# Patient Record
Sex: MALE | Race: WHITE | HISPANIC OR LATINO | URBAN - METROPOLITAN AREA
[De-identification: names, ages, dates, MRNs, and addresses within clinical notes are randomized per-mention and may not be internally consistent; named-entity substitution may affect disease eponyms.]

---

## 2022-02-13 ENCOUNTER — APPOINTMENT (OUTPATIENT)
Dept: RADIOLOGY | Facility: MEDICAL CENTER | Age: 47
DRG: 853 | End: 2022-02-13
Attending: EMERGENCY MEDICINE
Payer: COMMERCIAL

## 2022-02-13 ENCOUNTER — HOSPITAL ENCOUNTER (INPATIENT)
Facility: MEDICAL CENTER | Age: 47
LOS: 4 days | DRG: 853 | End: 2022-02-17
Attending: EMERGENCY MEDICINE | Admitting: INTERNAL MEDICINE
Payer: COMMERCIAL

## 2022-02-13 DIAGNOSIS — I21.4 NSTEMI (NON-ST ELEVATED MYOCARDIAL INFARCTION) (HCC): ICD-10-CM

## 2022-02-13 DIAGNOSIS — R41.82 ALTERED MENTAL STATUS, UNSPECIFIED ALTERED MENTAL STATUS TYPE: ICD-10-CM

## 2022-02-13 DIAGNOSIS — I46.9 CARDIAC ARREST (HCC): ICD-10-CM

## 2022-02-13 DIAGNOSIS — R57.0 CARDIOGENIC SHOCK (HCC): ICD-10-CM

## 2022-02-13 DIAGNOSIS — J96.01 ACUTE RESPIRATORY FAILURE WITH HYPOXIA (HCC): ICD-10-CM

## 2022-02-13 LAB
ALBUMIN SERPL BCP-MCNC: 4.8 G/DL (ref 3.2–4.9)
ALBUMIN/GLOB SERPL: 1.5 G/DL
ALP SERPL-CCNC: 89 U/L (ref 30–99)
ALT SERPL-CCNC: 133 U/L (ref 2–50)
ANION GAP SERPL CALC-SCNC: 21 MMOL/L (ref 7–16)
APAP SERPL-MCNC: <5 UG/ML (ref 10–30)
AST SERPL-CCNC: 207 U/L (ref 12–45)
BASOPHILS # BLD AUTO: 0 % (ref 0–1.8)
BASOPHILS # BLD: 0 K/UL (ref 0–0.12)
BILIRUB SERPL-MCNC: 0.5 MG/DL (ref 0.1–1.5)
BUN SERPL-MCNC: 15 MG/DL (ref 8–22)
BURR CELLS BLD QL SMEAR: NORMAL
CALCIUM SERPL-MCNC: 9.2 MG/DL (ref 8.5–10.5)
CHLORIDE SERPL-SCNC: 100 MMOL/L (ref 96–112)
CO2 SERPL-SCNC: 17 MMOL/L (ref 20–33)
CREAT SERPL-MCNC: 0.95 MG/DL (ref 0.5–1.4)
EKG IMPRESSION: NORMAL
EKG IMPRESSION: NORMAL
EOSINOPHIL # BLD AUTO: 0.15 K/UL (ref 0–0.51)
EOSINOPHIL NFR BLD: 0.9 % (ref 0–6.9)
ERYTHROCYTE [DISTWIDTH] IN BLOOD BY AUTOMATED COUNT: 40.4 FL (ref 35.9–50)
ETHANOL BLD-MCNC: <10.1 MG/DL (ref 0–10)
GLOBULIN SER CALC-MCNC: 3.1 G/DL (ref 1.9–3.5)
GLUCOSE BLD-MCNC: 232 MG/DL (ref 65–99)
GLUCOSE SERPL-MCNC: 255 MG/DL (ref 65–99)
HCT VFR BLD AUTO: 47.9 % (ref 42–52)
HGB BLD-MCNC: 16.6 G/DL (ref 14–18)
LACTATE BLD-SCNC: 5.2 MMOL/L (ref 0.5–2)
LYMPHOCYTES # BLD AUTO: 3.44 K/UL (ref 1–4.8)
LYMPHOCYTES NFR BLD: 20 % (ref 22–41)
MANUAL DIFF BLD: NORMAL
MCH RBC QN AUTO: 32.2 PG (ref 27–33)
MCHC RBC AUTO-ENTMCNC: 34.7 G/DL (ref 33.7–35.3)
MCV RBC AUTO: 92.8 FL (ref 81.4–97.8)
MONOCYTES # BLD AUTO: 1.79 K/UL (ref 0–0.85)
MONOCYTES NFR BLD AUTO: 10.4 % (ref 0–13.4)
MORPHOLOGY BLD-IMP: NORMAL
MYELOCYTES NFR BLD MANUAL: 1.7 %
NEUTROPHILS # BLD AUTO: 11.52 K/UL (ref 1.82–7.42)
NEUTROPHILS NFR BLD: 67 % (ref 44–72)
NRBC # BLD AUTO: 0 K/UL
NRBC BLD-RTO: 0 /100 WBC
PLATELET # BLD AUTO: 249 K/UL (ref 164–446)
PLATELET BLD QL SMEAR: NORMAL
PMV BLD AUTO: 11.1 FL (ref 9–12.9)
POIKILOCYTOSIS BLD QL SMEAR: NORMAL
POTASSIUM SERPL-SCNC: 3.5 MMOL/L (ref 3.6–5.5)
PROT SERPL-MCNC: 7.9 G/DL (ref 6–8.2)
RBC # BLD AUTO: 5.16 M/UL (ref 4.7–6.1)
RBC BLD AUTO: PRESENT
SALICYLATES SERPL-MCNC: <1 MG/DL (ref 15–25)
SODIUM SERPL-SCNC: 138 MMOL/L (ref 135–145)
TROPONIN T SERPL-MCNC: 82 NG/L (ref 6–19)
WBC # BLD AUTO: 17.2 K/UL (ref 4.8–10.8)

## 2022-02-13 PROCEDURE — 700111 HCHG RX REV CODE 636 W/ 250 OVERRIDE (IP)

## 2022-02-13 PROCEDURE — 82077 ASSAY SPEC XCP UR&BREATH IA: CPT

## 2022-02-13 PROCEDURE — 71045 X-RAY EXAM CHEST 1 VIEW: CPT

## 2022-02-13 PROCEDURE — 700111 HCHG RX REV CODE 636 W/ 250 OVERRIDE (IP): Performed by: INTERNAL MEDICINE

## 2022-02-13 PROCEDURE — 82962 GLUCOSE BLOOD TEST: CPT

## 2022-02-13 PROCEDURE — 302214 INTUBATION BOX: Performed by: EMERGENCY MEDICINE

## 2022-02-13 PROCEDURE — 93005 ELECTROCARDIOGRAM TRACING: CPT | Performed by: EMERGENCY MEDICINE

## 2022-02-13 PROCEDURE — 31500 INSERT EMERGENCY AIRWAY: CPT

## 2022-02-13 PROCEDURE — 700111 HCHG RX REV CODE 636 W/ 250 OVERRIDE (IP): Performed by: EMERGENCY MEDICINE

## 2022-02-13 PROCEDURE — 93005 ELECTROCARDIOGRAM TRACING: CPT

## 2022-02-13 PROCEDURE — 94760 N-INVAS EAR/PLS OXIMETRY 1: CPT

## 2022-02-13 PROCEDURE — 92950 HEART/LUNG RESUSCITATION CPR: CPT

## 2022-02-13 PROCEDURE — 80053 COMPREHEN METABOLIC PANEL: CPT

## 2022-02-13 PROCEDURE — 96375 TX/PRO/DX INJ NEW DRUG ADDON: CPT

## 2022-02-13 PROCEDURE — 700101 HCHG RX REV CODE 250: Performed by: INTERNAL MEDICINE

## 2022-02-13 PROCEDURE — C1751 CATH, INF, PER/CENT/MIDLINE: HCPCS

## 2022-02-13 PROCEDURE — 80179 DRUG ASSAY SALICYLATE: CPT

## 2022-02-13 PROCEDURE — 0BH17EZ INSERTION OF ENDOTRACHEAL AIRWAY INTO TRACHEA, VIA NATURAL OR ARTIFICIAL OPENING: ICD-10-PCS | Performed by: EMERGENCY MEDICINE

## 2022-02-13 PROCEDURE — 5A1945Z RESPIRATORY VENTILATION, 24-96 CONSECUTIVE HOURS: ICD-10-PCS | Performed by: EMERGENCY MEDICINE

## 2022-02-13 PROCEDURE — 99291 CRITICAL CARE FIRST HOUR: CPT

## 2022-02-13 PROCEDURE — 99223 1ST HOSP IP/OBS HIGH 75: CPT | Performed by: STUDENT IN AN ORGANIZED HEALTH CARE EDUCATION/TRAINING PROGRAM

## 2022-02-13 PROCEDURE — 80143 DRUG ASSAY ACETAMINOPHEN: CPT

## 2022-02-13 PROCEDURE — 70450 CT HEAD/BRAIN W/O DYE: CPT

## 2022-02-13 PROCEDURE — 83605 ASSAY OF LACTIC ACID: CPT

## 2022-02-13 PROCEDURE — 84484 ASSAY OF TROPONIN QUANT: CPT

## 2022-02-13 PROCEDURE — 85007 BL SMEAR W/DIFF WBC COUNT: CPT

## 2022-02-13 PROCEDURE — 700101 HCHG RX REV CODE 250

## 2022-02-13 PROCEDURE — 85027 COMPLETE CBC AUTOMATED: CPT

## 2022-02-13 PROCEDURE — 770022 HCHG ROOM/CARE - ICU (200)

## 2022-02-13 PROCEDURE — 02HV33Z INSERTION OF INFUSION DEVICE INTO SUPERIOR VENA CAVA, PERCUTANEOUS APPROACH: ICD-10-PCS | Performed by: EMERGENCY MEDICINE

## 2022-02-13 PROCEDURE — 94002 VENT MGMT INPAT INIT DAY: CPT

## 2022-02-13 PROCEDURE — 303105 HCHG CATHETER EXTRA

## 2022-02-13 PROCEDURE — 51702 INSERT TEMP BLADDER CATH: CPT

## 2022-02-13 PROCEDURE — 36556 INSERT NON-TUNNEL CV CATH: CPT

## 2022-02-13 RX ORDER — SUCCINYLCHOLINE CHLORIDE 20 MG/ML
150 INJECTION INTRAMUSCULAR; INTRAVENOUS ONCE
Status: COMPLETED | OUTPATIENT
Start: 2022-02-13 | End: 2022-02-13

## 2022-02-13 RX ORDER — HEPARIN SODIUM 5000 [USP'U]/100ML
0-30 INJECTION, SOLUTION INTRAVENOUS CONTINUOUS
Status: DISCONTINUED | OUTPATIENT
Start: 2022-02-14 | End: 2022-02-16

## 2022-02-13 RX ORDER — HEPARIN SODIUM 1000 [USP'U]/ML
2000 INJECTION, SOLUTION INTRAVENOUS; SUBCUTANEOUS PRN
Status: DISCONTINUED | OUTPATIENT
Start: 2022-02-13 | End: 2022-02-16

## 2022-02-13 RX ORDER — NALOXONE HYDROCHLORIDE 1 MG/ML
INJECTION INTRAMUSCULAR; INTRAVENOUS; SUBCUTANEOUS
Status: DISCONTINUED
Start: 2022-02-13 | End: 2022-02-13

## 2022-02-13 RX ORDER — HEPARIN SODIUM 1000 [USP'U]/ML
4000 INJECTION, SOLUTION INTRAVENOUS; SUBCUTANEOUS ONCE
Status: COMPLETED | OUTPATIENT
Start: 2022-02-14 | End: 2022-02-14

## 2022-02-13 RX ORDER — KETAMINE HYDROCHLORIDE 50 MG/ML
INJECTION, SOLUTION INTRAMUSCULAR; INTRAVENOUS
Status: DISCONTINUED
Start: 2022-02-13 | End: 2022-02-13

## 2022-02-13 RX ORDER — NOREPINEPHRINE BITARTRATE 0.03 MG/ML
INJECTION, SOLUTION INTRAVENOUS
Status: DISCONTINUED
Start: 2022-02-13 | End: 2022-02-13

## 2022-02-13 RX ORDER — KETAMINE HYDROCHLORIDE 50 MG/ML
150 INJECTION, SOLUTION INTRAMUSCULAR; INTRAVENOUS ONCE
Status: ACTIVE | OUTPATIENT
Start: 2022-02-13 | End: 2022-02-14

## 2022-02-13 RX ORDER — NALOXONE HYDROCHLORIDE 1 MG/ML
2 INJECTION INTRAMUSCULAR; INTRAVENOUS; SUBCUTANEOUS ONCE
Status: COMPLETED | OUTPATIENT
Start: 2022-02-13 | End: 2022-02-13

## 2022-02-13 RX ADMIN — Medication 100 MCG: at 22:15

## 2022-02-13 RX ADMIN — NALOXONE HYDROCHLORIDE 2 MG: 1 INJECTION INTRAMUSCULAR; INTRAVENOUS; SUBCUTANEOUS at 21:43

## 2022-02-13 RX ADMIN — Medication 100 MCG: at 22:00

## 2022-02-13 RX ADMIN — SUCCINYLCHOLINE CHLORIDE 150 MG: 20 INJECTION, SOLUTION INTRAMUSCULAR; INTRAVENOUS at 21:48

## 2022-02-13 RX ADMIN — KETAMINE HYDROCHLORIDE 150 MG: 50 INJECTION INTRAMUSCULAR; INTRAVENOUS at 21:48

## 2022-02-13 RX ADMIN — KETAMINE HYDROCHLORIDE 50 MG: 50 INJECTION INTRAMUSCULAR; INTRAVENOUS at 23:15

## 2022-02-13 RX ADMIN — NALOXONE HYDROCHLORIDE 2 MG: 1 INJECTION PARENTERAL at 21:43

## 2022-02-13 ASSESSMENT — FIBROSIS 4 INDEX: FIB4 SCORE: 3.39

## 2022-02-14 ENCOUNTER — APPOINTMENT (OUTPATIENT)
Dept: RADIOLOGY | Facility: MEDICAL CENTER | Age: 47
DRG: 853 | End: 2022-02-14
Attending: INTERNAL MEDICINE
Payer: COMMERCIAL

## 2022-02-14 ENCOUNTER — APPOINTMENT (OUTPATIENT)
Dept: CARDIOLOGY | Facility: MEDICAL CENTER | Age: 47
DRG: 853 | End: 2022-02-14
Attending: EMERGENCY MEDICINE
Payer: COMMERCIAL

## 2022-02-14 ENCOUNTER — APPOINTMENT (OUTPATIENT)
Dept: RADIOLOGY | Facility: MEDICAL CENTER | Age: 47
DRG: 853 | End: 2022-02-14
Attending: STUDENT IN AN ORGANIZED HEALTH CARE EDUCATION/TRAINING PROGRAM
Payer: COMMERCIAL

## 2022-02-14 PROBLEM — E11.9 TYPE 2 DIABETES MELLITUS (HCC): Status: ACTIVE | Noted: 2022-02-14

## 2022-02-14 PROBLEM — I21.4 NSTEMI (NON-ST ELEVATED MYOCARDIAL INFARCTION) (HCC): Status: ACTIVE | Noted: 2022-02-14

## 2022-02-14 PROBLEM — J96.00 RESPIRATORY FAILURE, ACUTE (HCC): Status: ACTIVE | Noted: 2022-02-14

## 2022-02-14 PROBLEM — G93.40 ACUTE ENCEPHALOPATHY: Status: ACTIVE | Noted: 2022-02-14

## 2022-02-14 PROBLEM — I24.9 ACS (ACUTE CORONARY SYNDROME) (HCC): Status: ACTIVE | Noted: 2022-02-14

## 2022-02-14 PROBLEM — J96.01 ACUTE RESPIRATORY FAILURE WITH HYPOXIA (HCC): Status: ACTIVE | Noted: 2022-02-14

## 2022-02-14 PROBLEM — R57.0 CARDIOGENIC SHOCK (HCC): Status: ACTIVE | Noted: 2022-02-14

## 2022-02-14 PROBLEM — E87.20 LACTIC ACIDOSIS: Status: ACTIVE | Noted: 2022-02-14

## 2022-02-14 PROBLEM — E83.42 HYPOMAGNESEMIA: Status: ACTIVE | Noted: 2022-02-14

## 2022-02-14 LAB
ALBUMIN SERPL BCP-MCNC: 4.4 G/DL (ref 3.2–4.9)
ALBUMIN/GLOB SERPL: 1.6 G/DL
ALP SERPL-CCNC: 74 U/L (ref 30–99)
ALT SERPL-CCNC: 143 U/L (ref 2–50)
AMPHET UR QL SCN: NEGATIVE
ANION GAP SERPL CALC-SCNC: 17 MMOL/L (ref 7–16)
APTT PPP: 137.2 SEC (ref 24.7–36)
AST SERPL-CCNC: 190 U/L (ref 12–45)
BARBITURATES UR QL SCN: NEGATIVE
BASE EXCESS BLDA CALC-SCNC: -4 MMOL/L (ref -4–3)
BENZODIAZ UR QL SCN: NEGATIVE
BILIRUB SERPL-MCNC: 0.5 MG/DL (ref 0.1–1.5)
BODY TEMPERATURE: ABNORMAL DEGREES
BREATHS SETTING VENT: 20
BUN SERPL-MCNC: 19 MG/DL (ref 8–22)
BZE UR QL SCN: NEGATIVE
CALCIUM SERPL-MCNC: 8.5 MG/DL (ref 8.5–10.5)
CANNABINOIDS UR QL SCN: NEGATIVE
CHLORIDE SERPL-SCNC: 105 MMOL/L (ref 96–112)
CO2 BLDA-SCNC: 22 MMOL/L (ref 20–33)
CO2 SERPL-SCNC: 17 MMOL/L (ref 20–33)
CREAT SERPL-MCNC: 0.7 MG/DL (ref 0.5–1.4)
CRP SERPL HS-MCNC: <0.3 MG/DL (ref 0–0.75)
DELSYS IDSYS: ABNORMAL
END TIDAL CARBON DIOXIDE IECO2: 30 MMHG
EST. AVERAGE GLUCOSE BLD GHB EST-MCNC: 148 MG/DL
FLUAV RNA SPEC QL NAA+PROBE: NEGATIVE
FLUBV RNA SPEC QL NAA+PROBE: NEGATIVE
GLOBULIN SER CALC-MCNC: 2.7 G/DL (ref 1.9–3.5)
GLUCOSE BLD-MCNC: 151 MG/DL (ref 65–99)
GLUCOSE BLD-MCNC: 221 MG/DL (ref 65–99)
GLUCOSE BLD-MCNC: 227 MG/DL (ref 65–99)
GLUCOSE BLD-MCNC: 258 MG/DL (ref 65–99)
GLUCOSE SERPL-MCNC: 233 MG/DL (ref 65–99)
HBA1C MFR BLD: 6.8 % (ref 4–5.6)
HCO3 BLDA-SCNC: 21.1 MMOL/L (ref 17–25)
HOROWITZ INDEX BLDA+IHG-RTO: 190 MM[HG]
INR PPP: 1.2 (ref 0.87–1.13)
LACTATE BLD-SCNC: 1.9 MMOL/L (ref 0.5–2)
LACTATE BLD-SCNC: 2.3 MMOL/L (ref 0.5–2)
LACTATE BLD-SCNC: 2.5 MMOL/L (ref 0.5–2)
LACTATE BLD-SCNC: 5.4 MMOL/L (ref 0.5–2)
LV EJECT FRACT MOD 2C 99903: 26.89
LV EJECT FRACT MOD 4C 99902: 29.84
LV EJECT FRACT MOD BP 99901: 27.97
MAGNESIUM SERPL-MCNC: 1.6 MG/DL (ref 1.5–2.5)
MAGNESIUM SERPL-MCNC: 1.8 MG/DL (ref 1.5–2.5)
METHADONE UR QL SCN: NEGATIVE
MODE IMODE: ABNORMAL
O2/TOTAL GAS SETTING VFR VENT: 100 %
OPIATES UR QL SCN: NEGATIVE
OXYCODONE UR QL SCN: NEGATIVE
PCO2 BLDA: 37.9 MMHG (ref 26–37)
PCO2 TEMP ADJ BLDA: 37.9 MMHG (ref 26–37)
PCP UR QL SCN: NEGATIVE
PEEP END EXPIRATORY PRESSURE IPEEP: 8 CMH20
PH BLDA: 7.35 [PH] (ref 7.4–7.5)
PH TEMP ADJ BLDA: 7.35 [PH] (ref 7.4–7.5)
PHOSPHATE SERPL-MCNC: 2.9 MG/DL (ref 2.5–4.5)
PHOSPHATE SERPL-MCNC: 3.3 MG/DL (ref 2.5–4.5)
PO2 BLDA: 190 MMHG (ref 64–87)
PO2 TEMP ADJ BLDA: 190 MMHG (ref 64–87)
POTASSIUM SERPL-SCNC: 3.7 MMOL/L (ref 3.6–5.5)
PREALB SERPL-MCNC: 8.4 MG/DL (ref 18–38)
PROPOXYPH UR QL SCN: NEGATIVE
PROT SERPL-MCNC: 7.1 G/DL (ref 6–8.2)
PROTHROMBIN TIME: 14.8 SEC (ref 12–14.6)
RSV RNA SPEC QL NAA+PROBE: NEGATIVE
SAO2 % BLDA: 100 % (ref 93–99)
SARS-COV-2 RNA RESP QL NAA+PROBE: NOTDETECTED
SODIUM SERPL-SCNC: 139 MMOL/L (ref 135–145)
SPECIMEN DRAWN FROM PATIENT: ABNORMAL
SPECIMEN SOURCE: NORMAL
TIDAL VOLUME IVT: 420 ML
TROPONIN T SERPL-MCNC: 1987 NG/L (ref 6–19)
TROPONIN T SERPL-MCNC: 415 NG/L (ref 6–19)
UFH PPP CHRO-ACNC: 0.33 IU/ML
UFH PPP CHRO-ACNC: 0.42 IU/ML
UFH PPP CHRO-ACNC: 0.57 IU/ML

## 2022-02-14 PROCEDURE — 96375 TX/PRO/DX INJ NEW DRUG ADDON: CPT

## 2022-02-14 PROCEDURE — 84134 ASSAY OF PREALBUMIN: CPT

## 2022-02-14 PROCEDURE — 36600 WITHDRAWAL OF ARTERIAL BLOOD: CPT

## 2022-02-14 PROCEDURE — A9270 NON-COVERED ITEM OR SERVICE: HCPCS | Performed by: INTERNAL MEDICINE

## 2022-02-14 PROCEDURE — A9270 NON-COVERED ITEM OR SERVICE: HCPCS | Performed by: STUDENT IN AN ORGANIZED HEALTH CARE EDUCATION/TRAINING PROGRAM

## 2022-02-14 PROCEDURE — 80053 COMPREHEN METABOLIC PANEL: CPT

## 2022-02-14 PROCEDURE — 95822 EEG COMA OR SLEEP ONLY: CPT | Performed by: PSYCHIATRY & NEUROLOGY

## 2022-02-14 PROCEDURE — B246ZZ4 ULTRASONOGRAPHY OF RIGHT AND LEFT HEART, TRANSESOPHAGEAL: ICD-10-PCS | Performed by: ANESTHESIOLOGY

## 2022-02-14 PROCEDURE — 700101 HCHG RX REV CODE 250: Performed by: INTERNAL MEDICINE

## 2022-02-14 PROCEDURE — 83605 ASSAY OF LACTIC ACID: CPT

## 2022-02-14 PROCEDURE — 85730 THROMBOPLASTIN TIME PARTIAL: CPT

## 2022-02-14 PROCEDURE — 95822 EEG COMA OR SLEEP ONLY: CPT | Mod: 26 | Performed by: PSYCHIATRY & NEUROLOGY

## 2022-02-14 PROCEDURE — 700111 HCHG RX REV CODE 636 W/ 250 OVERRIDE (IP)

## 2022-02-14 PROCEDURE — 0241U HCHG SARS-COV-2 COVID-19 NFCT DS RESP RNA 4 TRGT MIC: CPT

## 2022-02-14 PROCEDURE — 700111 HCHG RX REV CODE 636 W/ 250 OVERRIDE (IP): Performed by: STUDENT IN AN ORGANIZED HEALTH CARE EDUCATION/TRAINING PROGRAM

## 2022-02-14 PROCEDURE — 700111 HCHG RX REV CODE 636 W/ 250 OVERRIDE (IP): Performed by: INTERNAL MEDICINE

## 2022-02-14 PROCEDURE — 85520 HEPARIN ASSAY: CPT | Mod: 91

## 2022-02-14 PROCEDURE — 80307 DRUG TEST PRSMV CHEM ANLYZR: CPT

## 2022-02-14 PROCEDURE — C9803 HOPD COVID-19 SPEC COLLECT: HCPCS | Performed by: STUDENT IN AN ORGANIZED HEALTH CARE EDUCATION/TRAINING PROGRAM

## 2022-02-14 PROCEDURE — 99292 CRITICAL CARE ADDL 30 MIN: CPT | Performed by: INTERNAL MEDICINE

## 2022-02-14 PROCEDURE — 82803 BLOOD GASES ANY COMBINATION: CPT

## 2022-02-14 PROCEDURE — 770022 HCHG ROOM/CARE - ICU (200)

## 2022-02-14 PROCEDURE — 700102 HCHG RX REV CODE 250 W/ 637 OVERRIDE(OP): Performed by: STUDENT IN AN ORGANIZED HEALTH CARE EDUCATION/TRAINING PROGRAM

## 2022-02-14 PROCEDURE — 83735 ASSAY OF MAGNESIUM: CPT

## 2022-02-14 PROCEDURE — 700111 HCHG RX REV CODE 636 W/ 250 OVERRIDE (IP): Performed by: EMERGENCY MEDICINE

## 2022-02-14 PROCEDURE — 93306 TTE W/DOPPLER COMPLETE: CPT | Mod: 26 | Performed by: STUDENT IN AN ORGANIZED HEALTH CARE EDUCATION/TRAINING PROGRAM

## 2022-02-14 PROCEDURE — 700101 HCHG RX REV CODE 250

## 2022-02-14 PROCEDURE — 83036 HEMOGLOBIN GLYCOSYLATED A1C: CPT

## 2022-02-14 PROCEDURE — 99291 CRITICAL CARE FIRST HOUR: CPT | Mod: GC | Performed by: INTERNAL MEDICINE

## 2022-02-14 PROCEDURE — 85610 PROTHROMBIN TIME: CPT

## 2022-02-14 PROCEDURE — 93306 TTE W/DOPPLER COMPLETE: CPT

## 2022-02-14 PROCEDURE — 74018 RADEX ABDOMEN 1 VIEW: CPT

## 2022-02-14 PROCEDURE — 96368 THER/DIAG CONCURRENT INF: CPT

## 2022-02-14 PROCEDURE — 82962 GLUCOSE BLOOD TEST: CPT | Mod: 91

## 2022-02-14 PROCEDURE — 84484 ASSAY OF TROPONIN QUANT: CPT

## 2022-02-14 PROCEDURE — 86140 C-REACTIVE PROTEIN: CPT

## 2022-02-14 PROCEDURE — 700102 HCHG RX REV CODE 250 W/ 637 OVERRIDE(OP): Performed by: INTERNAL MEDICINE

## 2022-02-14 PROCEDURE — 94003 VENT MGMT INPAT SUBQ DAY: CPT

## 2022-02-14 PROCEDURE — 700105 HCHG RX REV CODE 258

## 2022-02-14 PROCEDURE — 94799 UNLISTED PULMONARY SVC/PX: CPT

## 2022-02-14 PROCEDURE — 96365 THER/PROPH/DIAG IV INF INIT: CPT

## 2022-02-14 PROCEDURE — 84100 ASSAY OF PHOSPHORUS: CPT | Mod: 91

## 2022-02-14 PROCEDURE — 99233 SBSQ HOSP IP/OBS HIGH 50: CPT | Performed by: INTERNAL MEDICINE

## 2022-02-14 RX ORDER — FAMOTIDINE 20 MG/1
20 TABLET, FILM COATED ORAL EVERY 12 HOURS
Status: DISCONTINUED | OUTPATIENT
Start: 2022-02-14 | End: 2022-02-17 | Stop reason: HOSPADM

## 2022-02-14 RX ORDER — ASPIRIN 81 MG/1
81 TABLET, CHEWABLE ORAL DAILY
Status: DISCONTINUED | OUTPATIENT
Start: 2022-02-15 | End: 2022-02-16

## 2022-02-14 RX ORDER — ATORVASTATIN CALCIUM 80 MG/1
80 TABLET, FILM COATED ORAL NIGHTLY
COMMUNITY

## 2022-02-14 RX ORDER — NOREPINEPHRINE BITARTRATE 0.03 MG/ML
0-30 INJECTION, SOLUTION INTRAVENOUS CONTINUOUS
Status: DISCONTINUED | OUTPATIENT
Start: 2022-02-14 | End: 2022-02-15

## 2022-02-14 RX ORDER — MAGNESIUM SULFATE HEPTAHYDRATE 40 MG/ML
4 INJECTION, SOLUTION INTRAVENOUS ONCE
Status: COMPLETED | OUTPATIENT
Start: 2022-02-14 | End: 2022-02-14

## 2022-02-14 RX ORDER — ATORVASTATIN CALCIUM 80 MG/1
80 TABLET, FILM COATED ORAL EVERY EVENING
Status: DISCONTINUED | OUTPATIENT
Start: 2022-02-14 | End: 2022-02-17 | Stop reason: HOSPADM

## 2022-02-14 RX ORDER — DEXTROSE MONOHYDRATE 25 G/50ML
50 INJECTION, SOLUTION INTRAVENOUS
Status: DISCONTINUED | OUTPATIENT
Start: 2022-02-14 | End: 2022-02-16

## 2022-02-14 RX ORDER — ACETAMINOPHEN 500 MG
1000 TABLET ORAL EVERY 6 HOURS
Status: DISCONTINUED | OUTPATIENT
Start: 2022-02-14 | End: 2022-02-14

## 2022-02-14 RX ORDER — LORAZEPAM 2 MG/ML
2-4 INJECTION INTRAMUSCULAR
Status: DISCONTINUED | OUTPATIENT
Start: 2022-02-14 | End: 2022-02-17 | Stop reason: HOSPADM

## 2022-02-14 RX ORDER — IPRATROPIUM BROMIDE AND ALBUTEROL SULFATE 2.5; .5 MG/3ML; MG/3ML
3 SOLUTION RESPIRATORY (INHALATION)
Status: DISCONTINUED | OUTPATIENT
Start: 2022-02-14 | End: 2022-02-17 | Stop reason: HOSPADM

## 2022-02-14 RX ORDER — MEPERIDINE HYDROCHLORIDE 50 MG/ML
25 INJECTION INTRAMUSCULAR; INTRAVENOUS; SUBCUTANEOUS
Status: DISCONTINUED | OUTPATIENT
Start: 2022-02-14 | End: 2022-02-16

## 2022-02-14 RX ORDER — DEXTROSE MONOHYDRATE 25 G/50ML
50 INJECTION, SOLUTION INTRAVENOUS
Status: DISCONTINUED | OUTPATIENT
Start: 2022-02-14 | End: 2022-02-14

## 2022-02-14 RX ORDER — ASPIRIN 325 MG
325 TABLET ORAL DAILY
Status: DISCONTINUED | OUTPATIENT
Start: 2022-02-14 | End: 2022-02-14

## 2022-02-14 RX ORDER — PHENYLEPHRINE HCL IN 0.9% NACL 0.5 MG/5ML
100 SYRINGE (ML) INTRAVENOUS ONCE
Status: COMPLETED | OUTPATIENT
Start: 2022-02-14 | End: 2022-02-13

## 2022-02-14 RX ORDER — AMOXICILLIN 250 MG
2 CAPSULE ORAL 2 TIMES DAILY
Status: DISCONTINUED | OUTPATIENT
Start: 2022-02-14 | End: 2022-02-17 | Stop reason: HOSPADM

## 2022-02-14 RX ORDER — OMEPRAZOLE 20 MG/1
20 CAPSULE, DELAYED RELEASE ORAL 2 TIMES DAILY
COMMUNITY

## 2022-02-14 RX ORDER — ASPIRIN 81 MG/1
81 TABLET, CHEWABLE ORAL DAILY
Status: DISCONTINUED | OUTPATIENT
Start: 2022-02-14 | End: 2022-02-14

## 2022-02-14 RX ORDER — BISACODYL 10 MG
10 SUPPOSITORY, RECTAL RECTAL
Status: DISCONTINUED | OUTPATIENT
Start: 2022-02-14 | End: 2022-02-17 | Stop reason: HOSPADM

## 2022-02-14 RX ORDER — ACETAMINOPHEN 500 MG
1000 TABLET ORAL EVERY 6 HOURS
Status: DISCONTINUED | OUTPATIENT
Start: 2022-02-14 | End: 2022-02-16

## 2022-02-14 RX ORDER — KETAMINE HYDROCHLORIDE 50 MG/ML
50 INJECTION, SOLUTION INTRAMUSCULAR; INTRAVENOUS ONCE
Status: COMPLETED | OUTPATIENT
Start: 2022-02-14 | End: 2022-02-13

## 2022-02-14 RX ORDER — LISINOPRIL 5 MG/1
5 TABLET ORAL DAILY
COMMUNITY

## 2022-02-14 RX ORDER — POLYETHYLENE GLYCOL 3350 17 G/17G
1 POWDER, FOR SOLUTION ORAL
Status: DISCONTINUED | OUTPATIENT
Start: 2022-02-14 | End: 2022-02-17 | Stop reason: HOSPADM

## 2022-02-14 RX ORDER — BUSPIRONE HYDROCHLORIDE 10 MG/1
15 TABLET ORAL 2 TIMES DAILY
Status: DISCONTINUED | OUTPATIENT
Start: 2022-02-14 | End: 2022-02-16

## 2022-02-14 RX ORDER — BUSPIRONE HYDROCHLORIDE 10 MG/1
15 TABLET ORAL 2 TIMES DAILY
Status: DISCONTINUED | OUTPATIENT
Start: 2022-02-14 | End: 2022-02-14

## 2022-02-14 RX ADMIN — FENTANYL CITRATE 100 MCG: 50 INJECTION INTRAMUSCULAR; INTRAVENOUS at 07:56

## 2022-02-14 RX ADMIN — LORAZEPAM 2 MG: 2 INJECTION INTRAMUSCULAR; INTRAVENOUS at 08:44

## 2022-02-14 RX ADMIN — FAMOTIDINE 20 MG: 20 TABLET ORAL at 18:33

## 2022-02-14 RX ADMIN — NOREPINEPHRINE BITARTRATE 3 MCG/MIN: 1 INJECTION, SOLUTION, CONCENTRATE INTRAVENOUS at 00:12

## 2022-02-14 RX ADMIN — ATORVASTATIN CALCIUM 80 MG: 80 TABLET, FILM COATED ORAL at 18:33

## 2022-02-14 RX ADMIN — POTASSIUM BICARBONATE 25 MEQ: 978 TABLET, EFFERVESCENT ORAL at 07:55

## 2022-02-14 RX ADMIN — INSULIN HUMAN 2 UNITS: 100 INJECTION, SOLUTION PARENTERAL at 02:44

## 2022-02-14 RX ADMIN — ATORVASTATIN CALCIUM 80 MG: 80 TABLET, FILM COATED ORAL at 03:34

## 2022-02-14 RX ADMIN — FENTANYL CITRATE 100 MCG: 50 INJECTION INTRAMUSCULAR; INTRAVENOUS at 02:48

## 2022-02-14 RX ADMIN — FENTANYL CITRATE 100 MCG: 50 INJECTION INTRAMUSCULAR; INTRAVENOUS at 15:26

## 2022-02-14 RX ADMIN — MEPERIDINE HYDROCHLORIDE 25 MG: 50 INJECTION INTRAMUSCULAR; INTRAVENOUS; SUBCUTANEOUS at 11:38

## 2022-02-14 RX ADMIN — FENTANYL CITRATE 100 MCG: 50 INJECTION INTRAMUSCULAR; INTRAVENOUS at 22:09

## 2022-02-14 RX ADMIN — FAMOTIDINE 20 MG: 10 INJECTION, SOLUTION INTRAVENOUS at 06:25

## 2022-02-14 RX ADMIN — INSULIN HUMAN 3 UNITS: 100 INJECTION, SOLUTION PARENTERAL at 06:30

## 2022-02-14 RX ADMIN — Medication 100 MCG/HR: at 06:26

## 2022-02-14 RX ADMIN — BUSPIRONE HYDROCHLORIDE 15 MG: 10 TABLET ORAL at 18:33

## 2022-02-14 RX ADMIN — MEPERIDINE HYDROCHLORIDE 25 MG: 50 INJECTION INTRAMUSCULAR; INTRAVENOUS; SUBCUTANEOUS at 23:35

## 2022-02-14 RX ADMIN — FENTANYL CITRATE 100 MCG: 50 INJECTION INTRAMUSCULAR; INTRAVENOUS at 16:13

## 2022-02-14 RX ADMIN — FENTANYL CITRATE 100 MCG: 50 INJECTION INTRAMUSCULAR; INTRAVENOUS at 03:41

## 2022-02-14 RX ADMIN — MEPERIDINE HYDROCHLORIDE 25 MG: 50 INJECTION INTRAMUSCULAR; INTRAVENOUS; SUBCUTANEOUS at 10:24

## 2022-02-14 RX ADMIN — ACETAMINOPHEN 1000 MG: 500 TABLET ORAL at 23:35

## 2022-02-14 RX ADMIN — BUSPIRONE HYDROCHLORIDE 15 MG: 10 TABLET ORAL at 06:00

## 2022-02-14 RX ADMIN — ASPIRIN 81 MG: 81 TABLET, CHEWABLE ORAL at 06:26

## 2022-02-14 RX ADMIN — MEPERIDINE HYDROCHLORIDE 25 MG: 50 INJECTION INTRAMUSCULAR; INTRAVENOUS; SUBCUTANEOUS at 13:20

## 2022-02-14 RX ADMIN — MEPERIDINE HYDROCHLORIDE 25 MG: 50 INJECTION INTRAMUSCULAR; INTRAVENOUS; SUBCUTANEOUS at 14:25

## 2022-02-14 RX ADMIN — ACETAMINOPHEN 1000 MG: 500 TABLET ORAL at 11:38

## 2022-02-14 RX ADMIN — HEPARIN SODIUM 12 UNITS/KG/HR: 5000 INJECTION, SOLUTION INTRAVENOUS at 00:28

## 2022-02-14 RX ADMIN — FENTANYL CITRATE 100 MCG: 50 INJECTION INTRAMUSCULAR; INTRAVENOUS at 19:54

## 2022-02-14 RX ADMIN — ACETAMINOPHEN 1000 MG: 500 TABLET ORAL at 18:32

## 2022-02-14 RX ADMIN — Medication 200 MCG/HR: at 16:15

## 2022-02-14 RX ADMIN — MEPERIDINE HYDROCHLORIDE 25 MG: 50 INJECTION INTRAMUSCULAR; INTRAVENOUS; SUBCUTANEOUS at 18:30

## 2022-02-14 RX ADMIN — PROPOFOL 10 MCG/KG/MIN: 10 INJECTION, EMULSION INTRAVENOUS at 16:16

## 2022-02-14 RX ADMIN — MAGNESIUM SULFATE HEPTAHYDRATE 4 G: 40 INJECTION, SOLUTION INTRAVENOUS at 03:33

## 2022-02-14 RX ADMIN — HEPARIN SODIUM 4000 UNITS: 1000 INJECTION, SOLUTION INTRAVENOUS; SUBCUTANEOUS at 00:27

## 2022-02-14 RX ADMIN — FENTANYL CITRATE 100 MCG: 50 INJECTION INTRAMUSCULAR; INTRAVENOUS at 04:50

## 2022-02-14 RX ADMIN — PROPOFOL 20 MCG/KG/MIN: 10 INJECTION, EMULSION INTRAVENOUS at 00:16

## 2022-02-14 RX ADMIN — ACETAMINOPHEN 1000 MG: 500 TABLET ORAL at 03:34

## 2022-02-14 ASSESSMENT — PAIN DESCRIPTION - PAIN TYPE
TYPE: ACUTE PAIN

## 2022-02-14 ASSESSMENT — FIBROSIS 4 INDEX: FIB4 SCORE: 3

## 2022-02-14 NOTE — ED TRIAGE NOTES
2139 Pt arrived in room. Ems reports pt. Was walking in [facility] when he fell face first and was unresponsive, Security at [facility] did 10 rounds of CPR and shocked patient once. Pt. Given 2.5mg of narcan per EMS in route. Pt. Bagged upon arrival in room.   2143 2mg Narcan given per Medical staff in ER. No response from patient.   Vital signs: 170/110 BP, , RR 36, O2 99% via 100% O2 delivery through ambu mask.  -Pt. Pupils dilated, prior to Narcn administration. Spontaneous movement noted to upper extremities, not visible to lower.   -2148 150mg Ketamine administered  -2148 150mg Succ administered.  -2148 232 BG finger stick   -2149 ET tube placed per MD  -2150 151/89 BP, Hr 107, O2 99% via ET tube, RR 18  -16 F temp rich placed  -2155 BP 89/60 BP, 96% O2,   -2155 1000 Ns given RAC  - 2200 100mg phenylephrine administered  Left AC.  -2200 chest xray obtained  -2204 83/57, HR 87, RR 20, O2 95% via ET tube

## 2022-02-14 NOTE — CARE PLAN
The patient is Watcher - Medium risk of patient condition declining or worsening         Progress made toward(s) clinical / shift goals:    Problem: Hemodynamics  Goal: Patient's hemodynamics, fluid balance and neurologic status will be stable or improve  Outcome: Progressing     Problem: Mechanical Ventilation  Goal: Safe management of artificial airway and ventilation  Outcome: Progressing  Goal: Successful weaning off mechanical ventilator, spontaneously maintains adequate gas exchange  Outcome: Progressing     Problem: Safety - Medical Restraint  Goal: Remains free of injury from restraints (Restraint for Interference with Medical Device)  Outcome: Progressing       Patient is not progressing towards the following goals:      Problem: Knowledge Deficit - Standard  Goal: Patient and family/care givers will demonstrate understanding of plan of care, disease process/condition, diagnostic tests and medications  Outcome: Not Progressing     Problem: Safety - Medical Restraint  Goal: Free from restraint(s) (Restraint for Interference with Medical Device)  Outcome: Not Progressing

## 2022-02-14 NOTE — PROGRESS NOTES
Lab called with critical result of lactic acid 5.4. Critical lab result read back. Dr. Aguilera notified of critical lab result.  Critical lab result read back by Dr. Aguilera.

## 2022-02-14 NOTE — ED NOTES
No signs of acute distress. Pt. Positioned comfortably in bed. Skin color is good. Chest rise is symmetrical.

## 2022-02-14 NOTE — ED PROVIDER NOTES
"ED Provider Note    CHIEF COMPLAINT  Chief Complaint   Patient presents with   • Unresponsive     ROSC upon EMS arrival.        HPI  Mina Dimas is a 46 y.o. male who presents to the emergency department with altered mental status. Past medical history unknown. EMS reports that [place] and was witnessed to be getting off of an elevator after seemingly having vomited and then he felt forward to the ground and unresponsive state. It did not appear that the patient was with any other persons that knew him. No additional personal history on scene.    Paramedics reported that [facility] security did place an AED during their initial assessment and there was a chalkboard rhythm and the patient was shocked once. 10 rounds of CPR reported. Patient had return of spontaneous circulation upon EMS evaluation. NPA was placed and BVM was used for ongoing airway management in route to the hospital. Blood sugar was appropriate in the 100s for them. Paramedic did note that the patient did have some ST depressions in V4, V5. No notable findings consistent with acute STEMI.    No history from patient given his current clinical condition.    REVIEW OF SYSTEMS  See HPI for further details. All other systems are negative.     PAST MEDICAL HISTORY       SOCIAL HISTORY  Social History     Tobacco Use   • Smoking status: Not on file   • Smokeless tobacco: Not on file   Substance and Sexual Activity   • Alcohol use: Not on file   • Drug use: Not on file   • Sexual activity: Not on file       SURGICAL HISTORY  patient denies any surgical history    CURRENT MEDICATIONS  Home Medications     Reviewed by Brittaney Purvis (Pharmacy Tech) on 02/13/22 at 2336  Med List Status: Unable to Obtain   Medication Last Dose Status        Patient Neil Taking any Medications                       ALLERGIES  Not on File    PHYSICAL EXAM  VITAL SIGNS: /76   Pulse (!) 119   Resp (!) 22   Ht 1.727 m (5' 8\")   Wt 74.3 " kg (163 lb 12.8 oz)   SpO2 100%   BMI 24.91 kg/m²  @SINCERE[[id number]::@   Pulse ox interpretation: I interpret this pulse ox as normal.  Constitutional: Alert in no apparent distress.  HENT: small abrasion nasal bridge, bilateral external ears normal, Nose normal.   Eyes: Pupils are equal and reactive  Neck: Normal range of motion, No tenderness, Supple  Cardiovascular: Regular rate and rhythm, no murmurs.   Thorax & Lungs: Normal breath sounds, equal rise and fall  Abdomen: Bowel sounds normal, Soft  Skin: Warm, Dry, No erythema, No rash.   Extremities: Intact distal pulses  Musculoskeletal: Good range of motion in all major joints. No tenderness to palpation or major deformities noted.   Neurologic: GCS =3. Spontaneous movement of right upper extremity. No notable movement of left upper extremity or bilateral lower extremities. Pupils are mid ranged and fixed.      DIAGNOSTIC STUDIES / PROCEDURES      LABS  Results for orders placed or performed during the hospital encounter of 02/13/22   CBC w/ Differential   Result Value Ref Range    WBC 17.2 (H) 4.8 - 10.8 K/uL    RBC 5.16 4.70 - 6.10 M/uL    Hemoglobin 16.6 14.0 - 18.0 g/dL    Hematocrit 47.9 42.0 - 52.0 %    MCV 92.8 81.4 - 97.8 fL    MCH 32.2 27.0 - 33.0 pg    MCHC 34.7 33.7 - 35.3 g/dL    RDW 40.4 35.9 - 50.0 fL    Platelet Count 249 164 - 446 K/uL    MPV 11.1 9.0 - 12.9 fL    Neutrophils-Polys 67.00 44.00 - 72.00 %    Lymphocytes 20.00 (L) 22.00 - 41.00 %    Monocytes 10.40 0.00 - 13.40 %    Eosinophils 0.90 0.00 - 6.90 %    Basophils 0.00 0.00 - 1.80 %    Nucleated RBC 0.00 /100 WBC    Neutrophils (Absolute) 11.52 (H) 1.82 - 7.42 K/uL    Lymphs (Absolute) 3.44 1.00 - 4.80 K/uL    Monos (Absolute) 1.79 (H) 0.00 - 0.85 K/uL    Eos (Absolute) 0.15 0.00 - 0.51 K/uL    Baso (Absolute) 0.00 0.00 - 0.12 K/uL    NRBC (Absolute) 0.00 K/uL   Complete Metabolic Panel (CMP)   Result Value Ref Range    Sodium 138 135 - 145 mmol/L    Potassium 3.5 (L) 3.6 - 5.5 mmol/L     Chloride 100 96 - 112 mmol/L    Co2 17 (L) 20 - 33 mmol/L    Anion Gap 21.0 (H) 7.0 - 16.0    Glucose 255 (H) 65 - 99 mg/dL    Bun 15 8 - 22 mg/dL    Creatinine 0.95 0.50 - 1.40 mg/dL    Calcium 9.2 8.5 - 10.5 mg/dL    AST(SGOT) 207 (H) 12 - 45 U/L    ALT(SGPT) 133 (H) 2 - 50 U/L    Alkaline Phosphatase 89 30 - 99 U/L    Total Bilirubin 0.5 0.1 - 1.5 mg/dL    Albumin 4.8 3.2 - 4.9 g/dL    Total Protein 7.9 6.0 - 8.2 g/dL    Globulin 3.1 1.9 - 3.5 g/dL    A-G Ratio 1.5 g/dL   Troponin STAT   Result Value Ref Range    Troponin T 82 (H) 6 - 19 ng/L   LACTIC ACID   Result Value Ref Range    Lactic Acid 5.2 (HH) 0.5 - 2.0 mmol/L   LACTIC ACID   Result Value Ref Range    Lactic Acid 1.9 0.5 - 2.0 mmol/L   DIAGNOSTIC ALCOHOL   Result Value Ref Range    Diagnostic Alcohol <10.1 0.0 - 10.0 mg/dL   Salicylate   Result Value Ref Range    Salicylates, Quant. <1.0 (L) 15.0 - 25.0 mg/dL   ACETAMINOPHEN   Result Value Ref Range    Acetaminophen -Tylenol <5.0 (L) 10.0 - 30.0 ug/mL   URINE DRUG SCREEN   Result Value Ref Range    Amphetamines Urine Negative Negative    Barbiturates Negative Negative    Benzodiazepines Negative Negative    Cocaine Metabolite Negative Negative    Methadone Negative Negative    Opiates Negative Negative    Oxycodone Negative Negative    Phencyclidine -Pcp Negative Negative    Propoxyphene Negative Negative    Cannabinoid Metab Negative Negative   ESTIMATED GFR   Result Value Ref Range    GFR If African American >60 >60 mL/min/1.73 m 2    GFR If Non African American >60 >60 mL/min/1.73 m 2   MORPHOLOGY   Result Value Ref Range    RBC Morphology Present     Poikilocytosis 1+     Echinocytes 1+    PERIPHERAL SMEAR REVIEW   Result Value Ref Range    Peripheral Smear Review see below    DIFFERENTIAL MANUAL   Result Value Ref Range    Myelocytes 1.70 %    Manual Diff Status PERFORMED    PLATELET ESTIMATE   Result Value Ref Range    Plt Estimation Normal    aPTT   Result Value Ref Range    APTT 137.2 (HH) 24.7  - 36.0 sec   Prothrombin Time   Result Value Ref Range    PT 14.8 (H) 12.0 - 14.6 sec    INR 1.20 (H) 0.87 - 1.13   Heparin Xa (Unfractionated)   Result Value Ref Range    Heparin Xa (UFH) 0.57 IU/mL   MAGNESIUM   Result Value Ref Range    Magnesium 1.6 1.5 - 2.5 mg/dL   PHOSPHORUS   Result Value Ref Range    Phosphorus 2.9 2.5 - 4.5 mg/dL   HEMOGLOBIN A1C   Result Value Ref Range    Glycohemoglobin 6.8 (H) 4.0 - 5.6 %    Est Avg Glucose 148 mg/dL   CRP QUANTITIVE (NON-CARDIAC)   Result Value Ref Range    Stat C-Reactive Protein <0.30 0.00 - 0.75 mg/dL   TROPONIN   Result Value Ref Range    Troponin T 415 (H) 6 - 19 ng/L   Comp Metabolic Panel   Result Value Ref Range    Sodium 139 135 - 145 mmol/L    Potassium 3.7 3.6 - 5.5 mmol/L    Chloride 105 96 - 112 mmol/L    Co2 17 (L) 20 - 33 mmol/L    Anion Gap 17.0 (H) 7.0 - 16.0    Glucose 233 (H) 65 - 99 mg/dL    Bun 19 8 - 22 mg/dL    Creatinine 0.70 0.50 - 1.40 mg/dL    Calcium 8.5 8.5 - 10.5 mg/dL    AST(SGOT) 190 (H) 12 - 45 U/L    ALT(SGPT) 143 (H) 2 - 50 U/L    Alkaline Phosphatase 74 30 - 99 U/L    Total Bilirubin 0.5 0.1 - 1.5 mg/dL    Albumin 4.4 3.2 - 4.9 g/dL    Total Protein 7.1 6.0 - 8.2 g/dL    Globulin 2.7 1.9 - 3.5 g/dL    A-G Ratio 1.6 g/dL   ESTIMATED GFR   Result Value Ref Range    GFR If African American >60 >60 mL/min/1.73 m 2    GFR If Non African American >60 >60 mL/min/1.73 m 2   EKG   Result Value Ref Range    Report       [facility] Emergency Dept.    Test Date:  2022  Pt Name:    LOIS COSTELLO                    Department: ER  MRN:        [id number]                      Room:        05  Gender:     Male                         Technician: [id number]  :        1975                   Requested By:ER TRIAGE PROTOCOL  Order #:    [id number]                    Reading MD:    Measurements  Intervals                                Axis  Rate:       101                          P:          79  KY:         156                           QRS:        62  QRSD:       92                           T:          247  QT:         376  QTc:        488    Interpretive Statements  SINUS TACHYCARDIA  PROBABLE INFERIOR INFARCT, AGE INDETERMINATE  CONSIDER POSTERIOR WALL INVOLVEMENT  REPOL ABNRM SUGGESTS ISCHEMIA, DIFFUSE LEADS  BORDERLINE PROLONGED QT INTERVAL  No previous ECG available for comparison     EKG   Result Value Ref Range    Report       [facility] Emergency Dept.    Test Date:  2022  Pt Name:    LOIS COSTELLO                    Department: ER  MRN:        [id number]                      Room:       [facility]  Gender:     Male                         Technician: [id number]  :        1975                   Requested By:SINDI SHANKS  Order #:    [id number]                    Reading MD:    Measurements  Intervals                                Axis  Rate:       88                           P:          67  NH:         160                          QRS:        99  QRSD:       136                          T:          -17  QT:         448  QTc:        542    Interpretive Statements  SINUS RHYTHM  RBBB AND LPFB  INFERIOR INFARCT, AGE INDETERMINATE  ST DEPRESSION, CONSIDER ISCHEMIA, ANT-LAT LDS  Compared to ECG 2022 21:35:48  Left posterior fascicular block now present  Right bundle-branch block now present  ST (T wave) deviation now present  Sinus tachycardia no longer present  Early repolarization no  longer present  Myocardial infarct finding still present  Possible ischemia still present     POCT glucose device results   Result Value Ref Range    Glucose - Accu-Ck 232 (H) 65 - 99 mg/dL   POCT arterial blood gas device results   Result Value Ref Range    Ph 7.353 (L) 7.400 - 7.500    Pco2 37.9 (H) 26.0 - 37.0 mmHg    Po2 190 (H) 64 - 87 mmHg    Tco2 22 20 - 33 mmol/L    S02 100 (H) 93 - 99 %    Hco3 21.1 17.0 - 25.0 mmol/L    BE -4 -4 - 3 mmol/L    Body Temp 37.0 C degrees    O2 Therapy 100 %    iPF Ratio 190     Ph Temp  Hallie 7.353 (L) 7.400 - 7.500    Pco2 Temp Co 37.9 (H) 26.0 - 37.0 mmHg    Po2 Temp Cor 190 (H) 64 - 87 mmHg    Specimen Arterial     DelSys Vent     End Tidal Carbon Dioxide 30 mmhg    Tidal Volume 420 mL    Peep End Expiratory Pressure 8 cmh20    Set Rate 20     Mode APV-CMV    POCT lactate device results   Result Value Ref Range    iStat Lactate 2.3 (H) 0.5 - 2.0 mmol/L         RADIOLOGY  CT-HEAD W/O   Final Result         1. No acute intracranial abnormality. No evidence of acute intracranial hemorrhage or mass lesion.                     DX-CHEST-PORTABLE (1 VIEW)   Final Result         Right central venous catheter with tip projecting over the expected area of the SVC.      DX-CHEST-PORTABLE (1 VIEW)   Final Result         Endotracheal tube with tip projecting over the mid thoracic trachea.      EC-ECHOCARDIOGRAM COMPLETE W/O CONT    (Results Pending)   DX-ABDOMEN FOR TUBE PLACEMENT    (Results Pending)         Intubation:   emergent patient secondary to the patient's current clinical condition. Patient had left nare NPA in place and BVM was being used upon arrival by EMS. RSI with ketamine and succ.. 7.5 ET to play shoes and glide scope. Clear visualization of the cords and passage of the tube through the cords. Breath sounds equal bilaterally. No epigastric sounds. Good color change. Good fogging of two. Chest x-ray confirming placement. No complications.    central line:   indication: post intubation hypotension and attended patients needing multiple medications including pressors.   Triple lumen central line placed in the right internal jugular vein using ultrasound guidance throughout for pre-inspection, initial placement and confirmation of placement. Initial cleaning with chlorhexidine. Entire procedure under sterile procedure including prep, draped. Line was secured using standard device and sutured in place. While patch and pachyderm used for final dressing.      2310: discussed case with cousin which  has now arrived at bedside. There currently visiting from the [place] area. He had the patient has known history of blood pressure, cholesterol and prior stent placement secondary to cardiac event approximately two years ago.    COURSE & MEDICAL DECISION MAKING  Pertinent Labs & Imaging studies reviewed. (See chart for details)  [age]-year-old male presented emergency departments after what seems to be a cardiac events. Known ACS and prior stenting. Given the fact that the ED did shock the patient is presume that the patient did have a dysrhythmia of sorts. Please see procedure note for the intubation and central line placement. CT head was negative and heparin was started after this. Echocardiogram was additionally ordered and pending. I discussed case with Dr. Garcia on call for the ICU which will admit at this time.      FINAL IMPRESSION  1. Cardiac arrest (HCC)    2. Altered mental status, unspecified altered mental status type            Electronically signed by: Rupesh Canseco M.D., 2/13/2022 10:47 PM

## 2022-02-14 NOTE — ASSESSMENT & PLAN NOTE
Intubated on 2/13  Cont full vent support  RT/O2 protocols  Vent bundle protocols  I am actively adjusting vent settings based on ABGs  Lung protective ventilation strategies-->changed to APRV with improvement of pO2 and stable pCO2  RASS of - 4 to 5 while on Impella and awaiting ECMO cannulation  Lasix as needed

## 2022-02-14 NOTE — PROCEDURES
VIDEO ELECTROENCEPHALOGRAM REPORT      Referring provider: Dr. Aguilera    DOS:  02/14/22  (total recording of 28 minutes).     INDICATION:  Mina Dimas 46 y.o. male presenting with cardiac arrest.     CURRENT ANTIEPILEPTIC REGIMEN: propofol     TECHNIQUE: 30 channel video electroencephalogram (EEG) was performed in accordance with the international 10-20 system. The study was reviewed in bipolar and referential montages. The recording examined the patient during sedated     DESCRIPTION OF THE RECORD:  The background consisted of low amplitude mixed theta and fast frequencies with intermixed delta slowing .No well-formed posterior dominant rhythm or anterior-posterior gradient was seen.  No drowsiness or sleep pattern seen.     ACTIVATION PROCEDURE:  Hyperventilation was performed by the patient for a total of 3 minutes. The technician performing the test noted good effort. No physiological build up seen.     Intermittent Photic stimulation was performed in a stepwise fashion from 1 to 30 Hz and elicited no photic driving response.     ICTAL AND/OR INTERICTAL FINDINGS:   No focal or generalized epileptiform activity noted. No regional slowing was seen during this routine study.  No clinical events or seizures were reported or recorded during the study.     EKG: sampling of the EKG recording demonstrated sinus rhythm.     EVENTS: shivering spells were noted, no ictal EEG correlate.     INTERPRETATION:    This is an abnormal video EEG recording in the sedated state. The diffuse background slowing is consistent with encephalopathy and possible sedation effect.  No epileptiform discharges or seizures were seen. This does not preclude a diagnosis of epilepsy.   shivering spells were noted, no ictal EEG correlate. No seizure seen.     Nikolay Valentino MD  Diplomate in Neurology&Epilepsy  Office: [phone]  Fax: [phone]

## 2022-02-14 NOTE — HOSPITAL COURSE
Mr. Dimas is a 46 year old male with the past medical history of CAD and diabetes who collapsed at the [facility] with immediate bystander CPR and defibrillated out of VF/VT.  The patient was intubated and brought to [facility].  Cardiology is following and started on heparin drip and aspirin.

## 2022-02-14 NOTE — ASSESSMENT & PLAN NOTE
Resolving and following all commands  No further seizure activity  EEG with metabolic encephalopathy

## 2022-02-14 NOTE — DIETARY
Nutrition Services: Update   Addressing TF consult: per order that Dr Garcia placed, no feeds @ this time, just ordered set to place Cortrak.   Re-consult RD for TF recommendations as indicated

## 2022-02-14 NOTE — RESPIRATORY CARE
Assisted ERP intubating 7.5 ETT stylet in place and cuff checked, % preoxygenation, positive color change (yellow), bilateral breath sounds and fogging of the ETT. Secured at 25 at the teeth. Placed on initial vent settings.

## 2022-02-14 NOTE — PROGRESS NOTES
0245 Christopher CONTRERAS MD at bedside to assess patient and discuss plan of care. Verbal orders received to use sedation as needed and to hold off on nuero checks until the morning rounds. See flowsheets for neuro assessment with sedation off. At this time, an art line was not recommended by the physician.

## 2022-02-14 NOTE — CARE PLAN
Ventilator Daily Summary    Vent Day #2    Ventilator settings changed this shift:None    Weaning trials:no    Respiratory Procedures:none    Plan: Continue current ventilator settings and wean mechanical ventilation as tolerated per physician orders.     Problem: Ventilation  Goal: Ability to achieve and maintain unassisted ventilation or tolerate decreased levels of ventilator support  Description: Target End Date:  4 days     Document on Vent flowsheet    1.  Support and monitor invasive and noninvasive mechanical ventilation  2.  Monitor ventilator weaning response  3.  Perform ventilator associated pneumonia prevention interventions  4.  Manage ventilation therapy by monitoring diagnostic test results  Outcome: Progressing

## 2022-02-14 NOTE — ASSESSMENT & PLAN NOTE
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

## 2022-02-14 NOTE — CARE PLAN
Problem: Ventilation  Goal: Ability to achieve and maintain unassisted ventilation or tolerate decreased levels of ventilator support  Description: Target End Date:  4 days     Document on Vent flowsheet    1.  Support and monitor invasive and noninvasive mechanical ventilation  2.  Monitor ventilator weaning response  3.  Perform ventilator associated pneumonia prevention interventions  4.  Manage ventilation therapy by monitoring diagnostic test results  Outcome: Not Met       Vent Day # 2    7.5 @ 25    CMV 20, 420, +8, 70%

## 2022-02-14 NOTE — ED NOTES
Pt. Left ER via dalia, respiratory, with NATA Bowden. Report given bedside. Transported on ventilator

## 2022-02-14 NOTE — PROGRESS NOTES
Cortrak Placement    Tube Team verified patient name and medical record number prior to tube placement.  Cortrak tube (43 inches, 10 [place]) placed at 75 cm in right nare.  Per Cortrak picture, tube appears to be in the stomach.  Nursing Instructions: Awaiting KUB to confirm placement before use for medications or feeding. Once placement confirmed, flush tube with 30 ml of water, and then remove and save stylet, in patient medication drawer.

## 2022-02-14 NOTE — PROGRESS NOTES
Cardiology Follow Up Progress Note    Date of Service  2/14/2022    Attending Physician  Anita Aguilera M.D.    Chief Complaint   Status post arrest, hypotension on pressor support, dilated cardiomyopathy.    HPI  Mina Dimas is a 46 y.o. male admitted 2/13/2022 with above.    Interim Events  No significant changes noted from cardiac standpoint within the past 24 hours.    Review of Systems  Review of Systems   Unable to perform ROS: Intubated       Vital signs in last 24 hours  Pulse:  [] 106  Resp:  [13-30] 16  BP: ()/() 100/76  SpO2:  [84 %-100 %] 99 %    Physical Exam  Physical Exam  Constitutional:       Interventions: He is sedated and intubated.   HENT:      Head: Normocephalic and atraumatic.   Eyes:      Comments: Closed.   Cardiovascular:      Rate and Rhythm: Normal rate and regular rhythm.   Pulmonary:      Effort: He is intubated.      Breath sounds: Normal breath sounds.   Abdominal:      General: Bowel sounds are normal.      Palpations: Abdomen is soft.   Skin:     General: Skin is warm and dry.         Lab Review  Lab Results   Component Value Date/Time    WBC 17.2 (H) 02/13/2022 09:40 PM    RBC 5.16 02/13/2022 09:40 PM    HEMOGLOBIN 16.6 02/13/2022 09:40 PM    HEMATOCRIT 47.9 02/13/2022 09:40 PM    MCV 92.8 02/13/2022 09:40 PM    MCH 32.2 02/13/2022 09:40 PM    MCHC 34.7 02/13/2022 09:40 PM    MPV 11.1 02/13/2022 09:40 PM      Lab Results   Component Value Date/Time    SODIUM 139 02/14/2022 01:50 AM    POTASSIUM 3.7 02/14/2022 01:50 AM    CHLORIDE 105 02/14/2022 01:50 AM    CO2 17 (L) 02/14/2022 01:50 AM    GLUCOSE 233 (H) 02/14/2022 01:50 AM    BUN 19 02/14/2022 01:50 AM    CREATININE 0.70 02/14/2022 01:50 AM      Lab Results   Component Value Date/Time    ASTSGOT 190 (H) 02/14/2022 01:50 AM    ALTSGPT 143 (H) 02/14/2022 01:50 AM     Lab Results   Component Value Date/Time    TROPONINT 1987 (H) 02/14/2022 05:50 AM       No results for input(s): NTPROBNP in the last 72  hours.    Cardiac Imaging and Procedures Review  CARDIAC STUDIES/PROCEDURES:    ECHOCARDIOGRAM CONCLUSIONS (02/14/22)  Severely reduced left ventricular systolic function.   The left ventricular ejection fraction is visually estimated to be 30%.   Global hypokinesis with regional variation and apical akinesis.  The right ventricle is normal in size and systolic function.  No significant valvular abnormalities.  No prior study is available for comparison. Cardiology team following   the patient, aware of results.(study result reviewed)    EKG performed on (02/13/21) was reviewed: EKG personally interpreted shows sinus rhythm with non-specific intra-ventricular conduction delay.    Assessment/Plan  1. Out of hospital arrest with dilated cardiomyopathy: She will be scheduled for cardiac catheterization and AICD when clinically recovered from neurological standpoint.  2. Hypotension: Currently on pressor support.      Thank you for allowing me to participate in the care of this patient.  I will continue to follow this patient    Please contact me with any questions.    Chapin May M.D.   Cardiologist, [facility]  [phone]

## 2022-02-14 NOTE — ED NOTES
Patient unable to participate in interview  No pharmacy on file  No contacts in demographics  Unable to obtain Med rec at this time

## 2022-02-14 NOTE — ASSESSMENT & PLAN NOTE
Out of hospital cardiac arrest due to NSTEMI  Cont all supportive therapy  Monitor for arrythmia  Mg goal > 2, K goal > 4  Cardiology following  S/p normothermia protocols

## 2022-02-14 NOTE — ASSESSMENT & PLAN NOTE
Concern for both distributive (septic) and cardiogenic shock-->primarily cardiogenic with LV Ef at <10%  Bowie-Kamla catheter placed and noted both low CI and depressed SVR initially  Impella placed on 2/16 for cardiogenic shock with CO at 3.7, no LV function noted and will need transfer to an ECMO/transplant center.  Referrals made and family with preference to [facility].  Cont levophed and epineprhine for MAP goals > 65  Dobutamine at 5  S/p methylene blue for refractory shock on 2/16  Pan cultured-->cont Unasyn  Stop stress dose steroids since cortisol 22  Follow lactates, UOP

## 2022-02-14 NOTE — CONSULTS
Pulmonary & Critical Care Consultation    Date of consult: 2/14/2022    Referring Physician  Juan Pablo Garcia M.D.    Reason for Consultation  Unresponsive (ROSC upon EMS arrival. )      History of Present Illness  Mina Dimas is a 46 y.o. male with a PMHx significant for myocardial infarction s/p stent, type 2 diabetes, GERD who presents on 2/14/2022 with out of hospital cardiac arrest. History obtained from cousin Landon at bedside as patient was intubated prior to assessment.    Per patient's cousin, he has a history of GERD, as well as MI approx 5 years ago in , after which he received stenting. As far as his cousin is aware, he does not do illicit drugs, and takes a daily atorvastatin, unsure if taking aspirin. Landon reports that the patient had been complaining of chest burning/pain throughout the day and was ill appearing, having to hunch over at times to catch his breath and rest. He took an extra dose of antacid and had [place], did not do any illicit drugs as far as he knows. He is reported to have fallen while exiting an elevator after which CPR was initiated and he did receive 1 shock with ROSC.     Code Status  Full    Past Medical History   has no past medical history on file.    Past Surgical History   has no past surgical history on file.    Medications  Home Medications     Reviewed by Brittaney Purvis (Pharmacy Tech) on 02/13/22 at 2336  Med List Status: Unable to Obtain   Medication Last Dose Status        Patient Neil Taking any Medications                       Allergies  Not on File    Social History        Family History  family history is not on file.    Review of Systems  Review of Systems   Unable to perform ROS: Intubated       Vital Signs last 24 hours  Pulse:  [] 107  Resp:  [16-30] 23  BP: ()/(53-92) 110/76  SpO2:  [84 %-98 %] 91 %  O2 therapy: Pulse Oximetry: 91 %, O2 Delivery Device: ETT;Resuscitation Bag    Fluids  No intake  or output data in the 24 hours ending 02/14/22 0114    Physical Exam  Physical Exam  HENT:      Head: Normocephalic and atraumatic.      Right Ear: External ear normal.      Left Ear: External ear normal.      Mouth/Throat:      Mouth: Mucous membranes are moist.      Pharynx: Oropharynx is clear.   Eyes:      Comments: Pupils dilated, sluggish, equal   Cardiovascular:      Rate and Rhythm: Regular rhythm. Tachycardia present.      Pulses: Normal pulses.      Heart sounds: No murmur heard.      Pulmonary:      Breath sounds: No stridor. No wheezing or rales.   Abdominal:      General: Abdomen is flat.      Palpations: Abdomen is soft.   Musculoskeletal:         General: No swelling or deformity.      Right lower leg: No edema.      Left lower leg: No edema.   Skin:     Capillary Refill: Capillary refill takes less than 2 seconds.      Coloration: Skin is not jaundiced.      Findings: No bruising or lesion.   Neurological:      Comments: Intubated, withdraws to pain, spontaneous flexion of both forearms and hands         Laboratory  Recent Labs     02/13/22  2140   WBC 17.2*   NEUTSPOLYS 67.00   LYMPHOCYTES 20.00*   MONOCYTES 10.40   EOSINOPHILS 0.90   BASOPHILS 0.00   ASTSGOT 207*   ALTSGPT 133*   ALKPHOSPHAT 89   TBILIRUBIN 0.5     Recent Labs     02/13/22  2140   SODIUM 138   POTASSIUM 3.5*   CHLORIDE 100   CO2 17*   BUN 15   CREATININE 0.95   CALCIUM 9.2     Recent Labs     02/13/22  2140   ALTSGPT 133*   ASTSGOT 207*   ALKPHOSPHAT 89   TBILIRUBIN 0.5   GLUCOSE 255*           Imaging  CT-HEAD W/O   Final Result         1. No acute intracranial abnormality. No evidence of acute intracranial hemorrhage or mass lesion.                     DX-CHEST-PORTABLE (1 VIEW)   Final Result         Right central venous catheter with tip projecting over the expected area of the SVC.      DX-CHEST-PORTABLE (1 VIEW)   Final Result         Endotracheal tube with tip projecting over the mid thoracic trachea.      EC-ECHOCARDIOGRAM  COMPLETE W/O CONT    (Results Pending)       Assessment and Plan    46 year-old male presenting with out of hospital cardiac arrest and ROSC. Intubated and sedated. Etiology Is suspected to be MI given reported symptoms prior to presentation although with reported vomiting could be other cause I.e sz. Stat echo showing reduced EF. Ongoing medical management of suspected ACS, admit to ICU for continued ventilator support and assess for neurologic recovery and further workup.    * Cardiac arrest ([facility])  Assessment & Plan  Out of hospital cardiac arrest. Patient reportedly complaining of chest pain for several hours prior. History of MI with stent 5 years ago. ROSC with shockable rhythm prehospital. Suspect secondary to NSTEMI given reported symptoms.  -Ventilator bundle, lung protective strategy  -Repeat labs, check magnesium  -Titrate norepinephrine for MAP goal >65  -EEG to evaluate for seizure  -Check UDS  -Normothermia protocol    Respiratory failure, acute ([facility])  Assessment & Plan  Due to cardiac arrest  -Report of vomiting prior to arrest  -No sign of pneumonia on cxr  -Intubated, wean as able w/ SAT/SBT  -Propofol/fent for sedation/analgesia, pressures allowing  -Stress ulcer ppx with famotidine  -Check ABG    Cardiogenic shock ([facility])  Assessment & Plan  EF of 30% on stat echocardiogram, w/ history of MI but no reported history of heart failure. Likely due to ACS.  -Norepinephrine drip  -Hold off on beta blocker for now      ACS (acute coronary syndrome) ([facility])  Assessment & Plan  Likely cause given reported chest pain prior to presentation and history of MI and diabetes. Trop 82, EKG with depression in V3-V5.  -Cardiology following  -Aspirin + statin  -On heparin drip  -Trend troponin    Lactic acidosis  Assessment & Plan  Due to cardiac arrest  -Small fluid bolus if remains elevated on repeat    Type 2 diabetes mellitus ([facility])  Assessment & Plan  -Sliding scale insulin  -Check A1c

## 2022-02-14 NOTE — CONSULTS
Reason for Consult:  Asked by Dr Rupesh Canseco M.D. to see this patient with cardiac arrest  Patient's PCP: No primary care provider on file.    CC:   Chief Complaint   Patient presents with   • Unresponsive     ROSC upon EMS arrival.        HPI:  Mina Dimas is a 46 year old with no p known rior cardiac history who presented via EMS [place] after walking out of an elevator (vomiting prior to collapse as per bystanders). The patient received 10 rounds of CPR and required one shock with ROSC. Per EMS, the patient had shockable rhythm.     On arrival to the ER, the patient was obtunded, and only moves right upper extremity per Dr. Canseco.  The patient was intubated for airway protection.      Medications / Drug list prior to admission:  No current facility-administered medications on file prior to encounter.     No current outpatient medications on file prior to encounter.       Current list of administered Medications:    Current Facility-Administered Medications:   •  Naloxone HCl (NARCAN) injection 2 mg, 2 mg, Intravenous, Once, Rupesh Canseco M.D.  •  ketamine (KETALAR) 50 mg/ml injection, 150 mg, Intravenous, Once, Rupesh Canseco M.D.  •  succinylcholine (ANECTINE) injection 150 mg, 150 mg, Intravenous, Once, Rupesh Canseco M.D.  No current outpatient medications on file.    No past medical history on file.    No past surgical history on file.    No family history on file.  Patient family history was personally reviewed, no pertinent family history to current presentation    Social History     Tobacco Use   • Smoking status: Not on file   • Smokeless tobacco: Not on file   Substance Use Topics   • Alcohol use: Not on file   • Drug use: Not on file       ALLERGIES:  Not on File    Review of systems:  Unable to obtain    Physical exam:  Patient Vitals for the past 24 hrs:   BP Pulse Resp SpO2   02/13/22 2215 (!) 71/53 90 20 96 %   02/13/22 2210 (!) 84/57 93 16 95 %   02/13/22   (!) 83/57 89 20 96 %   22 (!) 72/53 (!) 101 -- 95 %   22 (!) 74/53 (!) 102 19 96 %   22 -- 97 (!) 30 98 %     General: Intubated and sedated.   HEENT: ET tube in place  CVS: RRR. S1 + S2. No M/R/G  Resp: CTAB.  Abdomen: Soft, NT, ND,  Skin: Grossly nothing acute no obvious rashes  Neurological: Intubated and sedated  Extremities: Pulse 2+ in b/l LE.  No edema. No cyanosis.       Data:  Laboratory studies personally reviewed by me:  Recent Results (from the past 24 hour(s))   EKG    Collection Time: 22  9:35 PM   Result Value Ref Range    Report       [facility] Emergency Dept.    Test Date:  2022  Pt Name:    LOIS COSTELLO                    Department: ER  MRN:        [id number]                      Room:       [facility]  Gender:     Male                         Technician: [id number]  :        1975                   Requested By:ER TRIAGE PROTOCOL  Order #:    [id number]                    Reading MD:    Measurements  Intervals                                Axis  Rate:       101                          P:          79  NJ:         156                          QRS:        62  QRSD:       92                           T:          247  QT:         376  QTc:        488    Interpretive Statements  SINUS TACHYCARDIA  PROBABLE INFERIOR INFARCT, AGE INDETERMINATE  CONSIDER POSTERIOR WALL INVOLVEMENT  REPOL ABNRM SUGGESTS ISCHEMIA, DIFFUSE LEADS  BORDERLINE PROLONGED QT INTERVAL  No previous ECG available for comparison     CBC w/ Differential    Collection Time: 22  9:40 PM   Result Value Ref Range    WBC 17.2 (H) 4.8 - 10.8 K/uL    RBC 5.16 4.70 - 6.10 M/uL    Hemoglobin 16.6 14.0 - 18.0 g/dL    Hematocrit 47.9 42.0 - 52.0 %    MCV 92.8 81.4 - 97.8 fL    MCH 32.2 27.0 - 33.0 pg    MCHC 34.7 33.7 - 35.3 g/dL    RDW 40.4 35.9 - 50.0 fL    Platelet Count 249 164 - 446 K/uL    MPV 11.1 9.0 - 12.9 fL    Neutrophils-Polys 67.00 44.00 - 72.00 %    Lymphocytes  20.00 (L) 22.00 - 41.00 %    Monocytes 10.40 0.00 - 13.40 %    Eosinophils 0.90 0.00 - 6.90 %    Basophils 0.00 0.00 - 1.80 %    Nucleated RBC 0.00 /100 WBC    Neutrophils (Absolute) 11.52 (H) 1.82 - 7.42 K/uL    Lymphs (Absolute) 3.44 1.00 - 4.80 K/uL    Monos (Absolute) 1.79 (H) 0.00 - 0.85 K/uL    Eos (Absolute) 0.15 0.00 - 0.51 K/uL    Baso (Absolute) 0.00 0.00 - 0.12 K/uL    NRBC (Absolute) 0.00 K/uL   Complete Metabolic Panel (CMP)    Collection Time: 02/13/22  9:40 PM   Result Value Ref Range    Sodium 138 135 - 145 mmol/L    Potassium 3.5 (L) 3.6 - 5.5 mmol/L    Chloride 100 96 - 112 mmol/L    Co2 17 (L) 20 - 33 mmol/L    Anion Gap 21.0 (H) 7.0 - 16.0    Glucose 255 (H) 65 - 99 mg/dL    Bun 15 8 - 22 mg/dL    Creatinine 0.95 0.50 - 1.40 mg/dL    Calcium 9.2 8.5 - 10.5 mg/dL    AST(SGOT) 207 (H) 12 - 45 U/L    ALT(SGPT) 133 (H) 2 - 50 U/L    Alkaline Phosphatase 89 30 - 99 U/L    Total Bilirubin 0.5 0.1 - 1.5 mg/dL    Albumin 4.8 3.2 - 4.9 g/dL    Total Protein 7.9 6.0 - 8.2 g/dL    Globulin 3.1 1.9 - 3.5 g/dL    A-G Ratio 1.5 g/dL   Troponin STAT    Collection Time: 02/13/22  9:40 PM   Result Value Ref Range    Troponin T 82 (H) 6 - 19 ng/L   LACTIC ACID    Collection Time: 02/13/22  9:40 PM   Result Value Ref Range    Lactic Acid 5.2 (HH) 0.5 - 2.0 mmol/L   DIAGNOSTIC ALCOHOL    Collection Time: 02/13/22  9:40 PM   Result Value Ref Range    Diagnostic Alcohol <10.1 0.0 - 10.0 mg/dL   Salicylate    Collection Time: 02/13/22  9:40 PM   Result Value Ref Range    Salicylates, Quant. <1.0 (L) 15.0 - 25.0 mg/dL   ACETAMINOPHEN    Collection Time: 02/13/22  9:40 PM   Result Value Ref Range    Acetaminophen -Tylenol <5.0 (L) 10.0 - 30.0 ug/mL   ESTIMATED GFR    Collection Time: 02/13/22  9:40 PM   Result Value Ref Range    GFR If African American >60 >60 mL/min/1.73 m 2    GFR If Non African American >60 >60 mL/min/1.73 m 2   MORPHOLOGY    Collection Time: 02/13/22  9:40 PM   Result Value Ref Range    RBC  Morphology Present     Poikilocytosis 1+     Echinocytes 1+    PERIPHERAL SMEAR REVIEW    Collection Time: 22  9:40 PM   Result Value Ref Range    Peripheral Smear Review see below    DIFFERENTIAL MANUAL    Collection Time: 22  9:40 PM   Result Value Ref Range    Myelocytes 1.70 %    Manual Diff Status PERFORMED    PLATELET ESTIMATE    Collection Time: 22  9:40 PM   Result Value Ref Range    Plt Estimation Normal    POCT glucose device results    Collection Time: 22  9:48 PM   Result Value Ref Range    Glucose - Accu-Ck 232 (H) 65 - 99 mg/dL   EKG    Collection Time: 22 10:12 PM   Result Value Ref Range    Report       [facility] Emergency Dept.    Test Date:  2022  Pt Name:    LOIS COSTELLO                    Department: ER  MRN:        [id number]                      Room:       [facility]  Gender:     Male                         Technician: [id number]  :        1975                   Requested By:SINDI SHANKS  Order #:    [id number]                    Reading MD:    Measurements  Intervals                                Axis  Rate:       88                           P:          67  [place]:         160                          QRS:        99  QRSD:       136                          T:          -17  QT:         448  QTc:        542    Interpretive Statements  SINUS RHYTHM  RBBB AND LPFB  INFERIOR INFARCT, AGE INDETERMINATE  ST DEPRESSION, CONSIDER ISCHEMIA, ANT-LAT LDS  Compared to ECG 2022 21:35:48  Left posterior fascicular block now present  Right bundle-branch block now present  ST (T wave) deviation now present  Sinus tachycardia no longer present  Early repolarization no  longer present  Myocardial infarct finding still present  Possible ischemia still present         Imaging:  CT-HEAD W/O   Final Result         1. No acute intracranial abnormality. No evidence of acute intracranial hemorrhage or mass lesion.                     DX-CHEST-PORTABLE (1 VIEW)    Final Result         Right central venous catheter with tip projecting over the expected area of the SVC.      DX-CHEST-PORTABLE (1 VIEW)   Final Result         Endotracheal tube with tip projecting over the mid thoracic trachea.      EC-ECHOCARDIOGRAM COMPLETE W/O CONT    (Results Pending)           EKG 2/13/2022: personally reviewed by me sinus tachycardia, diffuse ST depressions, no ST elevations.    All pertinent features of laboratory and imaging reviewed including primary images where applicable      Active Problems:    Cardiac arrest (HCC) POA: Unknown  Resolved Problems:    * No resolved hospital problems. *      Assessment / Plan:  Cardiac arrest  The patient received 10 rounds of CPR with 1 shock with ROSC.  Cardiac arrest with shockable rhythm per EMS.  Unclear etiology of cardiac arrest, ACS needs to be ruled out  -If no contraindications, start heparin gtt.   -Aspirin and high intensity statin  -Obtain echocardiogram to assess LVEF and any structural abnormalities.  -When the patient has meaningful neurologic recovery, will plan for coronary angiogram to rule out ischemic etiology of cardiac arrest.      I personally discussed his case with  Dr Rupesh Canseco M.D.    It is my pleasure to participate in the care of Mr. Dimas.  Please do not hesitate to contact me with questions or concerns.    Rula Mustafa MD   Cardiologist [facility]    2/13/2022    Please note that this dictation was created using voice recognition software. There may be errors I did not discover before finalizing the note.

## 2022-02-14 NOTE — PROGRESS NOTES
Critical Care Progress Note    Date of admission  2/13/2022    Chief Complaint  46 y.o. male admitted 2/13/2022 with out of hospital cardiac arrest    Hospital Course  Mr. Dimas is a 46 year old male with the past medical history of CAD and diabetes who collapsed at the [facility] with immediate bystander CPR and defibrillated out of VF/VT.  The patient was intubated and brought to [facility].  Cardiology is following and started on heparin drip and aspirin.      Interval Problem Update  Reviewed last 24 hour events:   - (+)cough/gag   - RASS -1 to +3   - Prop at 10   - fent at 100   - ? seizure this am, EEG   - SR/ST 90-110s   - -110s   - tmax 37.2   - multiple watery stools   - UOP of 375cc overnight, rich   - rihgt IJ TLC   - vent day #2   - CXR(reviewd): clear   - heparin gtt, asa, statin   - pepcid   - no abx   - covid negative   - Mg 1.8   - K 3.7   - -250s   - lactic acid 2.5-->5.4   - WBCs 17   - troponin 82-->415-->1987    Review of Systems  Review of Systems   Unable to perform ROS: Intubated        Vital Signs for last 24 hours   Pulse:  [] 107  Resp:  [16-30] 18  BP: ()/() 90/68  SpO2:  [84 %-100 %] 99 %    Hemodynamic parameters for last 24 hours       Respiratory Information for the last 24 hours  Vent Mode: APVCMV  Rate (breaths/min): 20  Vt Target (mL): 420  PEEP/CPAP: 8  MAP: 9.8  Control VTE (exp VT): 520    Physical Exam   Physical Exam  Vitals and nursing note reviewed.   HENT:      Head: Normocephalic and atraumatic.      Right Ear: External ear normal.      Left Ear: External ear normal.      Nose: Nose normal. No rhinorrhea.      Mouth/Throat:      Mouth: Mucous membranes are moist.      Comments: ETT in place  Eyes:      General: No scleral icterus.     Conjunctiva/sclera: Conjunctivae normal.      Comments: Pupils 2mm and reactive   Cardiovascular:      Rate and Rhythm: Regular rhythm. Tachycardia present.      Pulses: Normal pulses.      Heart sounds: Normal heart  sounds. No murmur heard.      Pulmonary:      Breath sounds: No wheezing.      Comments: Breathing comfortably on the vent, clear throughout  Chest:      Chest wall: No tenderness.   Abdominal:      General: There is no distension.      Palpations: Abdomen is soft.      Tenderness: There is no abdominal tenderness. There is no guarding or rebound.   Musculoskeletal:         General: Normal range of motion.      Cervical back: Normal range of motion and neck supple.      Right lower leg: No edema.      Left lower leg: No edema.   Lymphadenopathy:      Cervical: No cervical adenopathy.   Skin:     General: Skin is warm and dry.      Capillary Refill: Capillary refill takes less than 2 seconds.      Findings: No rash.   Neurological:      Sensory: No sensory deficit.      Motor: No weakness.      Comments: W/d to pain, moving all, not following commands, not tracking   Psychiatric:      Comments: Unable to assess         Medications  Current Facility-Administered Medications   Medication Dose Route Frequency Provider Last Rate Last Admin   • norepinephrine (Levophed) 8 mg in 250 mL NS infusion (premix)  0-30 mcg/min Intravenous Continuous Rupesh Canseco M.D. 5.6 mL/hr at 02/14/22 0012 3 mcg/min at 02/14/22 0012   • Respiratory Therapy Consult   Nebulization Continuous RT Juan Pablo Garcia M.D.       • famotidine (PEPCID) tablet 20 mg  20 mg Enteral Tube Q12HRS Juan Pablo Garcia M.D.        Or   • famotidine (PEPCID) injection 20 mg  20 mg Intravenous Q12HRS Juan Pablo Garcia M.D.   20 mg at 02/14/22 0625   • senna-docusate (PERICOLACE or SENOKOT S) 8.6-50 MG per tablet 2 Tablet  2 Tablet Enteral Tube BID Juan Pablo Garcia M.D.        And   • polyethylene glycol/lytes (MIRALAX) PACKET 1 Packet  1 Packet Enteral Tube QDAY PRN Juan Pablo Garcia M.D.        And   • magnesium hydroxide (MILK OF MAGNESIA) suspension 30 mL  30 mL Enteral Tube QDAY PRN Juan Pablo Garcia M.D.        And   • bisacodyl (DULCOLAX) suppository 10  mg  10 mg Rectal QDAY PRN Juan Pablo Garcia M.D.       • MD Alert...ICU Electrolyte Replacement per Pharmacy   Other PHARMACY TO DOSE Juan Pablo Garcia M.D.       • lidocaine (XYLOCAINE) 1 % injection 2 mL  2 mL Tracheal Tube Q30 MIN PRN Juan Pablo Garcia M.D.       • Pharmacy Consult: Enteral tube insertion - review meds/change route/product selection  1 Each Other PHARMACY TO DOSE Juan Pablo Garcia M.D.       • fentaNYL (SUBLIMAZE) injection 50 mcg  50 mcg Intravenous Q15 MIN PRN Juan Pablo Garcia M.D.        And   • fentaNYL (SUBLIMAZE) injection 100 mcg  100 mcg Intravenous Q15 MIN PRN Juan Pablo Garcia M.D.   100 mcg at 02/14/22 0450    And   • fentaNYL (SUBLIMAZE) 50 mcg/mL in 50mL (Continuous Infusion)   Intravenous Continuous Juan Pablo Garcia M.D. 2 mL/hr at 02/14/22 0626 100 mcg/hr at 02/14/22 0626    And   • propofol (DIPRIVAN) injection  0-40 mcg/kg/min Intravenous Continuous Juan Pablo Garcia M.D.   Stopped at 02/14/22 0155   • ipratropium-albuterol (DUONEB) nebulizer solution  3 mL Nebulization Q2HRS PRN (RT) Juan Pablo Garcia M.D.       • insulin regular (HumuLIN R,NovoLIN R) injection  1-6 Units Subcutaneous Q6HRS Juan Pablo Garcia M.D.   3 Units at 02/14/22 0630    And   • dextrose 50% (D50W) injection 50 mL  50 mL Intravenous Q15 MIN PRN Juan Pablo Garcia M.D.       • atorvastatin (LIPITOR) tablet 80 mg  80 mg Enteral Tube Q EVENING Christopher Keller M.D.   80 mg at 02/14/22 0334   • aspirin (ASA) chewable tab 81 mg  81 mg Oral DAILY Juan Pablo Garcia M.D.   81 mg at 02/14/22 0626   • Pharmacy Consult: pharmacy to discontinue all other orders for acetaminophen   Other PHARMACY TO DOSE Christopher Keller M.D.       • acetaminophen (TYLENOL) tablet 1,000 mg  1,000 mg Oral Q6HRS Christopher Keller M.D.   1,000 mg at 02/14/22 0334   • busPIRone (BUSPAR) tablet 15 mg  15 mg Oral BID Christopher Keller M.D.   15 mg at 02/14/22 0600   • meperidine (DEMEROL) injection 25 mg  25 mg Intravenous Q HOUR PRN Christopher Keller M.D.       •  magnesium sulfate IVPB premix 4 g  4 g Intravenous Once Christopher Keller M.D. 25 mL/hr at 02/14/22 0333 4 g at 02/14/22 0333   • ketamine (KETALAR) 50 mg/ml injection  150 mg Intravenous Once Rupesh Canseco M.D.       • heparin infusion 25,000 units in 500 mL 0.45% NACL  0-30 Units/kg/hr Intravenous Continuous Rupesh Canseco M.D. 18.5 mL/hr at 02/14/22 0136 12 Units/kg/hr at 02/14/22 0136   • heparin injection 2,000 Units  2,000 Units Intravenous PRN Rupesh Canseco M.D.           Fluids  No intake or output data in the 24 hours ending 02/14/22 0649    Laboratory  Recent Labs     02/14/22 0159   ISTATAPH 7.353*   ISTATAPCO2 37.9*   ISTATAPO2 190*   ISTATATCO2 22   LSCZXKK5YRZ 100*   ISTATARTHCO3 21.1   ISTATARTBE -4   ISTATTEMP 37.0 C   ISTATFIO2 100   ISTATSPEC Arterial   ISTATAPHTC 7.353*   [id number] 190*         Recent Labs     02/13/22 2140 02/14/22 0150 02/14/22  0315   SODIUM 138 139  --    POTASSIUM 3.5* 3.7  --    CHLORIDE 100 105  --    CO2 17* 17*  --    BUN 15 19  --    CREATININE 0.95 0.70  --    MAGNESIUM  --  1.6 1.8   PHOSPHORUS  --  2.9 3.3   CALCIUM 9.2 8.5  --      Recent Labs     02/13/22 2140 02/14/22 0150   ALTSGPT 133* 143*   ASTSGOT 207* 190*   ALKPHOSPHAT 89 74   TBILIRUBIN 0.5 0.5   GLUCOSE 255* 233*     Recent Labs     02/13/22 2140 02/14/22 0150   WBC 17.2*  --    NEUTSPOLYS 67.00  --    LYMPHOCYTES 20.00*  --    MONOCYTES 10.40  --    EOSINOPHILS 0.90  --    BASOPHILS 0.00  --    ASTSGOT 207* 190*   ALTSGPT 133* 143*   ALKPHOSPHAT 89 74   TBILIRUBIN 0.5 0.5     Recent Labs     02/13/22 2140 02/14/22  0150   RBC 5.16  --    HEMOGLOBIN 16.6  --    HEMATOCRIT 47.9  --    PLATELETCT 249  --    PROTHROMBTM  --  14.8*   APTT  --  137.2*   INR  --  1.20*       Imaging  ECHO:  Severely reduced left ventricular systolic function. The left   ventricular ejection fraction is visually estimated to be 30%. Global   hypokinesis with regional variation and apical akinesis  The right ventricle is  normal in size and systolic function.  No significant valvular abnormalities.  No prior study is available for comparison. Cardiology team following   the patient, aware of results.    Assessment/Plan  * Cardiac arrest (HCC)  Assessment & Plan  Out of hospital cardiac arrest due to NSTEMI  Cont supportive therapy  Monitor for arrythmia  Mg goal > 2, K goal > 4  Cardiology following  Normothermia protocols    Hypomagnesemia- (present on admission)  Assessment & Plan  Replete to goal > 2    Acute encephalopathy- (present on admission)  Assessment & Plan  ? Downtime and ?hypoxic injury  Monitor for seizures  EEG pending  Neuro checks    NSTEMI (non-ST elevated myocardial infarction) (HCC)- (present on admission)  Assessment & Plan  CAD hx  Heparin drip ongoing  ASA, high intensity statin  Cardiology following-->cath if there is neuro recovery    Lactic acidosis  Assessment & Plan  Due to cardiac arrest  Trending     Type 2 diabetes mellitus ([facility])  Assessment & Plan  BG goals 140-180s  High ISS  May need long acting insulin    Acute respiratory failure with hypoxia ([facility])  Assessment & Plan  Intubated on 2/13  Cont full vent support  RT/O2 protocols  Vent bundle protocols  I am actively adjusting vent settings based on ABGs  SAT/SBTs    Cardiogenic shock (HCC)  Assessment & Plan  EF of 30% on stat echocardiogram, w/ history of MI but no reported history of heart failure. Likely due to ACS.  Cont vasopressor for MAP goals > 65           VTE:  Heparin  Ulcer: H2 Antagonist  Lines: Central Line  Ongoing indication addressed and Root Catheter  Ongoing indication addressed    I have performed a physical exam and reviewed and updated ROS and Plan today (2/14/2022). In review of yesterday's note (2/13/2022), there are no changes except as documented above.     Discussed patient condition and risk of morbidity and/or mortality with RN, RT, Pharmacy, Charge nurse / hot rounds, Patient and cardiology    The patient remains  critically ill.  I have assessed and reassessed the respiratory status and made ventilator adjustments based upon arterial blood gas analysis, ventilator waveforms and airway mechanics.  I have assessed and reassessed the blood pressure, hemodynamics, cardiovascular status. This patient remains at high risk for worsening cardiopulmonary dysfunction and death without the above critical care interventions.     Additional critical care time to Dr. Garcia from earlier today= 65 minutes in directly providing and coordinating critical care and extensive data review.  No time overlap and excludes procedures.

## 2022-02-14 NOTE — ED NOTES
MD at bedside placing central line via Right IJ   Per MD verbal order patient given Right wrist splint, instructed on the use and care of this DME; instructed if insurance does not cover this DME pt./responsible party is responsible for the cost of the item, PT./responsible party verbalized understanding of instruction today.

## 2022-02-14 NOTE — PROGRESS NOTES
UNABLE to address med rec at this time  Attempting to reach family for medication list, no pharmacy on file.

## 2022-02-15 ENCOUNTER — APPOINTMENT (OUTPATIENT)
Dept: RADIOLOGY | Facility: MEDICAL CENTER | Age: 47
DRG: 853 | End: 2022-02-15
Attending: INTERNAL MEDICINE
Payer: COMMERCIAL

## 2022-02-15 ENCOUNTER — APPOINTMENT (OUTPATIENT)
Dept: RADIOLOGY | Facility: MEDICAL CENTER | Age: 47
DRG: 853 | End: 2022-02-15
Attending: STUDENT IN AN ORGANIZED HEALTH CARE EDUCATION/TRAINING PROGRAM
Payer: COMMERCIAL

## 2022-02-15 PROBLEM — R74.01 TRANSAMINITIS: Status: ACTIVE | Noted: 2022-02-15

## 2022-02-15 LAB
ALBUMIN SERPL BCP-MCNC: 3.7 G/DL (ref 3.2–4.9)
ALBUMIN/GLOB SERPL: 1.3 G/DL
ALP SERPL-CCNC: 72 U/L (ref 30–99)
ALT SERPL-CCNC: 95 U/L (ref 2–50)
ANION GAP SERPL CALC-SCNC: 15 MMOL/L (ref 7–16)
ANION GAP SERPL CALC-SCNC: 16 MMOL/L (ref 7–16)
AST SERPL-CCNC: 136 U/L (ref 12–45)
BASE EXCESS BLDA CALC-SCNC: -10 MMOL/L (ref -4–3)
BASE EXCESS BLDA CALC-SCNC: -5 MMOL/L (ref -4–3)
BASE EXCESS BLDA CALC-SCNC: -8 MMOL/L (ref -4–3)
BASE EXCESS BLDA CALC-SCNC: -9 MMOL/L (ref -4–3)
BASOPHILS # BLD AUTO: 0 % (ref 0–1.8)
BASOPHILS # BLD: 0 K/UL (ref 0–0.12)
BILIRUB SERPL-MCNC: 0.7 MG/DL (ref 0.1–1.5)
BODY TEMPERATURE: ABNORMAL DEGREES
BREATHS SETTING VENT: 20
BREATHS SETTING VENT: 28
BREATHS SETTING VENT: 28
BUN SERPL-MCNC: 33 MG/DL (ref 8–22)
BUN SERPL-MCNC: 44 MG/DL (ref 8–22)
BURR CELLS BLD QL SMEAR: NORMAL
CALCIUM SERPL-MCNC: 7.4 MG/DL (ref 8.5–10.5)
CALCIUM SERPL-MCNC: 8.2 MG/DL (ref 8.5–10.5)
CHLORIDE SERPL-SCNC: 104 MMOL/L (ref 96–112)
CHLORIDE SERPL-SCNC: 105 MMOL/L (ref 96–112)
CHOLEST SERPL-MCNC: 114 MG/DL (ref 100–199)
CO2 BLDA-SCNC: 18 MMOL/L (ref 20–33)
CO2 BLDA-SCNC: 20 MMOL/L (ref 20–33)
CO2 BLDA-SCNC: 23 MMOL/L (ref 20–33)
CO2 BLDA-SCNC: 24 MMOL/L (ref 20–33)
CO2 SERPL-SCNC: 18 MMOL/L (ref 20–33)
CO2 SERPL-SCNC: 20 MMOL/L (ref 20–33)
CREAT SERPL-MCNC: 1.32 MG/DL (ref 0.5–1.4)
CREAT SERPL-MCNC: 1.52 MG/DL (ref 0.5–1.4)
DELSYS IDSYS: ABNORMAL
END TIDAL CARBON DIOXIDE IECO2: 33 MMHG
END TIDAL CARBON DIOXIDE IECO2: 37 MMHG
END TIDAL CARBON DIOXIDE IECO2: 43 MMHG
END TIDAL CARBON DIOXIDE IECO2: 45 MMHG
EOSINOPHIL # BLD AUTO: 0.15 K/UL (ref 0–0.51)
EOSINOPHIL NFR BLD: 0.9 % (ref 0–6.9)
ERYTHROCYTE [DISTWIDTH] IN BLOOD BY AUTOMATED COUNT: 42.6 FL (ref 35.9–50)
ERYTHROCYTE [DISTWIDTH] IN BLOOD BY AUTOMATED COUNT: 43.8 FL (ref 35.9–50)
GLOBULIN SER CALC-MCNC: 2.9 G/DL (ref 1.9–3.5)
GLUCOSE BLD-MCNC: 116 MG/DL (ref 65–99)
GLUCOSE BLD-MCNC: 150 MG/DL (ref 65–99)
GLUCOSE BLD-MCNC: 223 MG/DL (ref 65–99)
GLUCOSE SERPL-MCNC: 160 MG/DL (ref 65–99)
GLUCOSE SERPL-MCNC: 232 MG/DL (ref 65–99)
HCO3 BLDA-SCNC: 16.8 MMOL/L (ref 17–25)
HCO3 BLDA-SCNC: 18.5 MMOL/L (ref 17–25)
HCO3 BLDA-SCNC: 21 MMOL/L (ref 17–25)
HCO3 BLDA-SCNC: 22.1 MMOL/L (ref 17–25)
HCT VFR BLD AUTO: 42.1 % (ref 42–52)
HCT VFR BLD AUTO: 51.4 % (ref 42–52)
HDLC SERPL-MCNC: 40 MG/DL
HGB BLD-MCNC: 14.5 G/DL (ref 14–18)
HGB BLD-MCNC: 17.3 G/DL (ref 14–18)
HOROWITZ INDEX BLDA+IHG-RTO: 100 MM[HG]
HOROWITZ INDEX BLDA+IHG-RTO: 68 MM[HG]
HOROWITZ INDEX BLDA+IHG-RTO: 87 MM[HG]
HOROWITZ INDEX BLDA+IHG-RTO: 93 MM[HG]
LACTATE BLD-SCNC: 4.3 MMOL/L (ref 0.5–2)
LACTATE BLD-SCNC: 4.8 MMOL/L (ref 0.5–2)
LDLC SERPL CALC-MCNC: 23 MG/DL
LYMPHOCYTES # BLD AUTO: 1.31 K/UL (ref 1–4.8)
LYMPHOCYTES NFR BLD: 7.8 % (ref 22–41)
MAGNESIUM SERPL-MCNC: 2.2 MG/DL (ref 1.5–2.5)
MAGNESIUM SERPL-MCNC: 2.2 MG/DL (ref 1.5–2.5)
MANUAL DIFF BLD: ABNORMAL
MCH RBC QN AUTO: 32.4 PG (ref 27–33)
MCH RBC QN AUTO: 32.5 PG (ref 27–33)
MCHC RBC AUTO-ENTMCNC: 33.7 G/DL (ref 33.7–35.3)
MCHC RBC AUTO-ENTMCNC: 34.4 G/DL (ref 33.7–35.3)
MCV RBC AUTO: 94 FL (ref 81.4–97.8)
MCV RBC AUTO: 96.6 FL (ref 81.4–97.8)
METAMYELOCYTES NFR BLD MANUAL: 5.2 %
MODE IMODE: ABNORMAL
MONOCYTES # BLD AUTO: 1.31 K/UL (ref 0–0.85)
MONOCYTES NFR BLD AUTO: 7.8 % (ref 0–13.4)
MORPHOLOGY BLD-IMP: NORMAL
NEUTROPHILS # BLD AUTO: 13.15 K/UL (ref 1.82–7.42)
NEUTROPHILS NFR BLD: 61.8 % (ref 44–72)
NEUTS BAND NFR BLD MANUAL: 16.5 % (ref 0–10)
NRBC # BLD AUTO: 0 K/UL
NRBC BLD-RTO: 0 /100 WBC
O2/TOTAL GAS SETTING VFR VENT: 100 %
O2/TOTAL GAS SETTING VFR VENT: 60 %
PCO2 BLDA: 36.9 MMHG (ref 26–37)
PCO2 BLDA: 46.5 MMHG (ref 26–37)
PCO2 BLDA: 47.4 MMHG (ref 26–37)
PCO2 BLDA: 57.7 MMHG (ref 26–37)
PCO2 TEMP ADJ BLDA: 37 MMHG (ref 26–37)
PCO2 TEMP ADJ BLDA: 46.1 MMHG (ref 26–37)
PCO2 TEMP ADJ BLDA: 47.2 MMHG (ref 26–37)
PCO2 TEMP ADJ BLDA: 57.2 MMHG (ref 26–37)
PEEP END EXPIRATORY PRESSURE IPEEP: 10 CMH20
PEEP END EXPIRATORY PRESSURE IPEEP: 10 CMH20
PEEP END EXPIRATORY PRESSURE IPEEP: 12 CMH20
PEEP END EXPIRATORY PRESSURE IPEEP: 8 CMH20
PERCENT MINUTE VOLUME IPMV: 140
PH BLDA: 7.17 [PH] (ref 7.4–7.5)
PH BLDA: 7.2 [PH] (ref 7.4–7.5)
PH BLDA: 7.26 [PH] (ref 7.4–7.5)
PH BLDA: 7.29 [PH] (ref 7.4–7.5)
PH TEMP ADJ BLDA: 7.17 [PH] (ref 7.4–7.5)
PH TEMP ADJ BLDA: 7.2 [PH] (ref 7.4–7.5)
PH TEMP ADJ BLDA: 7.26 [PH] (ref 7.4–7.5)
PH TEMP ADJ BLDA: 7.29 [PH] (ref 7.4–7.5)
PHOSPHATE SERPL-MCNC: 4.8 MG/DL (ref 2.5–4.5)
PLATELET # BLD AUTO: 183 K/UL (ref 164–446)
PLATELET # BLD AUTO: 210 K/UL (ref 164–446)
PLATELET BLD QL SMEAR: NORMAL
PMV BLD AUTO: 11.1 FL (ref 9–12.9)
PMV BLD AUTO: 11.3 FL (ref 9–12.9)
PO2 BLDA: 52 MMHG (ref 64–87)
PO2 BLDA: 56 MMHG (ref 64–87)
PO2 BLDA: 60 MMHG (ref 64–87)
PO2 BLDA: 68 MMHG (ref 64–87)
PO2 TEMP ADJ BLDA: 52 MMHG (ref 64–87)
PO2 TEMP ADJ BLDA: 57 MMHG (ref 64–87)
PO2 TEMP ADJ BLDA: 59 MMHG (ref 64–87)
PO2 TEMP ADJ BLDA: 68 MMHG (ref 64–87)
POIKILOCYTOSIS BLD QL SMEAR: NORMAL
POTASSIUM SERPL-SCNC: 4 MMOL/L (ref 3.6–5.5)
POTASSIUM SERPL-SCNC: 4.4 MMOL/L (ref 3.6–5.5)
PROT SERPL-MCNC: 6.6 G/DL (ref 6–8.2)
RBC # BLD AUTO: 4.48 M/UL (ref 4.7–6.1)
RBC # BLD AUTO: 5.32 M/UL (ref 4.7–6.1)
RBC BLD AUTO: PRESENT
SAO2 % BLDA: 76 % (ref 93–99)
SAO2 % BLDA: 85 % (ref 93–99)
SAO2 % BLDA: 87 % (ref 93–99)
SAO2 % BLDA: 89 % (ref 93–99)
SODIUM SERPL-SCNC: 139 MMOL/L (ref 135–145)
SODIUM SERPL-SCNC: 139 MMOL/L (ref 135–145)
SPECIMEN DRAWN FROM PATIENT: ABNORMAL
TIDAL VOLUME IVT: 420 ML
TRIGL SERPL-MCNC: 253 MG/DL (ref 0–149)
UFH PPP CHRO-ACNC: 0.24 IU/ML
UFH PPP CHRO-ACNC: 0.27 IU/ML
WBC # BLD AUTO: 16.8 K/UL (ref 4.8–10.8)
WBC # BLD AUTO: 9.3 K/UL (ref 4.8–10.8)

## 2022-02-15 PROCEDURE — A9270 NON-COVERED ITEM OR SERVICE: HCPCS | Performed by: INTERNAL MEDICINE

## 2022-02-15 PROCEDURE — 71045 X-RAY EXAM CHEST 1 VIEW: CPT

## 2022-02-15 PROCEDURE — 02HV33Z INSERTION OF INFUSION DEVICE INTO SUPERIOR VENA CAVA, PERCUTANEOUS APPROACH: ICD-10-PCS | Performed by: INTERNAL MEDICINE

## 2022-02-15 PROCEDURE — 700111 HCHG RX REV CODE 636 W/ 250 OVERRIDE (IP): Performed by: INTERNAL MEDICINE

## 2022-02-15 PROCEDURE — 85007 BL SMEAR W/DIFF WBC COUNT: CPT

## 2022-02-15 PROCEDURE — 700102 HCHG RX REV CODE 250 W/ 637 OVERRIDE(OP): Performed by: INTERNAL MEDICINE

## 2022-02-15 PROCEDURE — 700111 HCHG RX REV CODE 636 W/ 250 OVERRIDE (IP): Performed by: STUDENT IN AN ORGANIZED HEALTH CARE EDUCATION/TRAINING PROGRAM

## 2022-02-15 PROCEDURE — 80053 COMPREHEN METABOLIC PANEL: CPT

## 2022-02-15 PROCEDURE — 700111 HCHG RX REV CODE 636 W/ 250 OVERRIDE (IP): Performed by: EMERGENCY MEDICINE

## 2022-02-15 PROCEDURE — 94760 N-INVAS EAR/PLS OXIMETRY 1: CPT

## 2022-02-15 PROCEDURE — 02HQ32Z INSERTION OF MONITORING DEVICE INTO RIGHT PULMONARY ARTERY, PERCUTANEOUS APPROACH: ICD-10-PCS | Performed by: INTERNAL MEDICINE

## 2022-02-15 PROCEDURE — 83735 ASSAY OF MAGNESIUM: CPT

## 2022-02-15 PROCEDURE — 36620 INSERTION CATHETER ARTERY: CPT | Performed by: INTERNAL MEDICINE

## 2022-02-15 PROCEDURE — 36620 INSERTION CATHETER ARTERY: CPT

## 2022-02-15 PROCEDURE — 770022 HCHG ROOM/CARE - ICU (200)

## 2022-02-15 PROCEDURE — 99232 SBSQ HOSP IP/OBS MODERATE 35: CPT | Performed by: INTERNAL MEDICINE

## 2022-02-15 PROCEDURE — 99292 CRITICAL CARE ADDL 30 MIN: CPT | Mod: 25 | Performed by: STUDENT IN AN ORGANIZED HEALTH CARE EDUCATION/TRAINING PROGRAM

## 2022-02-15 PROCEDURE — 93503 INSERT/PLACE HEART CATHETER: CPT

## 2022-02-15 PROCEDURE — 84145 PROCALCITONIN (PCT): CPT

## 2022-02-15 PROCEDURE — A9270 NON-COVERED ITEM OR SERVICE: HCPCS | Performed by: STUDENT IN AN ORGANIZED HEALTH CARE EDUCATION/TRAINING PROGRAM

## 2022-02-15 PROCEDURE — 80048 BASIC METABOLIC PNL TOTAL CA: CPT

## 2022-02-15 PROCEDURE — 82803 BLOOD GASES ANY COMBINATION: CPT | Mod: 91

## 2022-02-15 PROCEDURE — 83605 ASSAY OF LACTIC ACID: CPT

## 2022-02-15 PROCEDURE — 85027 COMPLETE CBC AUTOMATED: CPT

## 2022-02-15 PROCEDURE — 36600 WITHDRAWAL OF ARTERIAL BLOOD: CPT

## 2022-02-15 PROCEDURE — 93503 INSERT/PLACE HEART CATHETER: CPT | Mod: RT | Performed by: INTERNAL MEDICINE

## 2022-02-15 PROCEDURE — 94799 UNLISTED PULMONARY SVC/PX: CPT

## 2022-02-15 PROCEDURE — 700111 HCHG RX REV CODE 636 W/ 250 OVERRIDE (IP)

## 2022-02-15 PROCEDURE — 03HY32Z INSERTION OF MONITORING DEVICE INTO UPPER ARTERY, PERCUTANEOUS APPROACH: ICD-10-PCS | Performed by: INTERNAL MEDICINE

## 2022-02-15 PROCEDURE — 37799 UNLISTED PX VASCULAR SURGERY: CPT

## 2022-02-15 PROCEDURE — 85520 HEPARIN ASSAY: CPT

## 2022-02-15 PROCEDURE — 84100 ASSAY OF PHOSPHORUS: CPT

## 2022-02-15 PROCEDURE — 700102 HCHG RX REV CODE 250 W/ 637 OVERRIDE(OP): Performed by: STUDENT IN AN ORGANIZED HEALTH CARE EDUCATION/TRAINING PROGRAM

## 2022-02-15 PROCEDURE — 94003 VENT MGMT INPAT SUBQ DAY: CPT

## 2022-02-15 PROCEDURE — 99291 CRITICAL CARE FIRST HOUR: CPT | Mod: 25 | Performed by: STUDENT IN AN ORGANIZED HEALTH CARE EDUCATION/TRAINING PROGRAM

## 2022-02-15 PROCEDURE — C1752 CATH,HEMODIALYSIS,SHORT-TERM: HCPCS

## 2022-02-15 PROCEDURE — 99292 CRITICAL CARE ADDL 30 MIN: CPT | Mod: 25 | Performed by: INTERNAL MEDICINE

## 2022-02-15 PROCEDURE — 700105 HCHG RX REV CODE 258: Performed by: INTERNAL MEDICINE

## 2022-02-15 PROCEDURE — 80061 LIPID PANEL: CPT

## 2022-02-15 PROCEDURE — 82962 GLUCOSE BLOOD TEST: CPT

## 2022-02-15 PROCEDURE — 700101 HCHG RX REV CODE 250: Performed by: INTERNAL MEDICINE

## 2022-02-15 RX ORDER — MIDAZOLAM HYDROCHLORIDE 1 MG/ML
1 INJECTION INTRAMUSCULAR; INTRAVENOUS ONCE
Status: DISCONTINUED | OUTPATIENT
Start: 2022-02-15 | End: 2022-02-15 | Stop reason: CLARIF

## 2022-02-15 RX ORDER — MIDAZOLAM HYDROCHLORIDE 1 MG/ML
1-5 INJECTION INTRAMUSCULAR; INTRAVENOUS ONCE
Status: COMPLETED | OUTPATIENT
Start: 2022-02-15 | End: 2022-02-15

## 2022-02-15 RX ORDER — DEXMEDETOMIDINE HYDROCHLORIDE 4 UG/ML
.1-1.5 INJECTION INTRAVENOUS CONTINUOUS
Status: DISCONTINUED | OUTPATIENT
Start: 2022-02-16 | End: 2022-02-16

## 2022-02-15 RX ORDER — MIDAZOLAM HYDROCHLORIDE 1 MG/ML
INJECTION INTRAMUSCULAR; INTRAVENOUS
Status: COMPLETED
Start: 2022-02-15 | End: 2022-02-15

## 2022-02-15 RX ORDER — FUROSEMIDE 10 MG/ML
20 INJECTION INTRAMUSCULAR; INTRAVENOUS
Status: DISCONTINUED | OUTPATIENT
Start: 2022-02-15 | End: 2022-02-17 | Stop reason: HOSPADM

## 2022-02-15 RX ORDER — EPINEPHRINE HCL IN 0.9 % NACL 4MG/250ML
0-10 PLASTIC BAG, INJECTION (ML) INTRAVENOUS CONTINUOUS
Status: DISCONTINUED | OUTPATIENT
Start: 2022-02-15 | End: 2022-02-16

## 2022-02-15 RX ADMIN — ACETAMINOPHEN 1000 MG: 500 TABLET ORAL at 12:44

## 2022-02-15 RX ADMIN — BUSPIRONE HYDROCHLORIDE 15 MG: 10 TABLET ORAL at 05:59

## 2022-02-15 RX ADMIN — FAMOTIDINE 20 MG: 10 INJECTION, SOLUTION INTRAVENOUS at 05:58

## 2022-02-15 RX ADMIN — BUSPIRONE HYDROCHLORIDE 15 MG: 10 TABLET ORAL at 17:36

## 2022-02-15 RX ADMIN — FENTANYL CITRATE 100 MCG: 50 INJECTION INTRAMUSCULAR; INTRAVENOUS at 23:58

## 2022-02-15 RX ADMIN — VASOPRESSIN 0.03 UNITS/MIN: 20 INJECTION PARENTERAL at 23:18

## 2022-02-15 RX ADMIN — Medication 200 MCG/HR: at 05:57

## 2022-02-15 RX ADMIN — ACETAMINOPHEN 1000 MG: 500 TABLET ORAL at 23:33

## 2022-02-15 RX ADMIN — DOBUTAMINE HYDROCHLORIDE 5 MCG/KG/MIN: 100 INJECTION INTRAVENOUS at 23:34

## 2022-02-15 RX ADMIN — HEPARIN SODIUM 12 UNITS/KG/HR: 5000 INJECTION, SOLUTION INTRAVENOUS at 05:06

## 2022-02-15 RX ADMIN — ATORVASTATIN CALCIUM 80 MG: 80 TABLET, FILM COATED ORAL at 17:36

## 2022-02-15 RX ADMIN — ACETAMINOPHEN 1000 MG: 500 TABLET ORAL at 05:58

## 2022-02-15 RX ADMIN — MEPERIDINE HYDROCHLORIDE 25 MG: 50 INJECTION INTRAMUSCULAR; INTRAVENOUS; SUBCUTANEOUS at 16:01

## 2022-02-15 RX ADMIN — FAMOTIDINE 20 MG: 20 TABLET ORAL at 17:36

## 2022-02-15 RX ADMIN — EPINEPHRINE 1 MCG/MIN: 1 INJECTION INTRAMUSCULAR; INTRAVENOUS; SUBCUTANEOUS at 22:01

## 2022-02-15 RX ADMIN — SODIUM BICARBONATE 100 MEQ: 84 INJECTION, SOLUTION INTRAVENOUS at 15:47

## 2022-02-15 RX ADMIN — ASPIRIN 81 MG: 81 TABLET, CHEWABLE ORAL at 05:58

## 2022-02-15 RX ADMIN — SODIUM BICARBONATE 50 MEQ: 84 INJECTION, SOLUTION INTRAVENOUS at 23:13

## 2022-02-15 RX ADMIN — DOBUTAMINE HYDROCHLORIDE 5 MCG/KG/MIN: 100 INJECTION INTRAVENOUS at 13:26

## 2022-02-15 RX ADMIN — HEPARIN SODIUM 2000 UNITS: 1000 INJECTION, SOLUTION INTRAVENOUS; SUBCUTANEOUS at 07:20

## 2022-02-15 RX ADMIN — PROPOFOL 10 MCG/KG/MIN: 10 INJECTION, EMULSION INTRAVENOUS at 22:30

## 2022-02-15 RX ADMIN — ACETAMINOPHEN 1000 MG: 500 TABLET ORAL at 17:36

## 2022-02-15 RX ADMIN — Medication 100 MEQ: at 15:47

## 2022-02-15 RX ADMIN — DOCUSATE SODIUM 50 MG AND SENNOSIDES 8.6 MG 2 TABLET: 8.6; 5 TABLET, FILM COATED ORAL at 17:36

## 2022-02-15 RX ADMIN — SODIUM CHLORIDE 3 G: 900 INJECTION INTRAVENOUS at 23:34

## 2022-02-15 RX ADMIN — MIDAZOLAM HYDROCHLORIDE 5 MG: 1 INJECTION INTRAMUSCULAR; INTRAVENOUS at 14:58

## 2022-02-15 RX ADMIN — MEPERIDINE HYDROCHLORIDE 25 MG: 50 INJECTION INTRAMUSCULAR; INTRAVENOUS; SUBCUTANEOUS at 19:30

## 2022-02-15 RX ADMIN — DOCUSATE SODIUM 50 MG AND SENNOSIDES 8.6 MG 2 TABLET: 8.6; 5 TABLET, FILM COATED ORAL at 05:58

## 2022-02-15 RX ADMIN — VASOPRESSIN 0.03 UNITS/MIN: 20 INJECTION PARENTERAL at 13:13

## 2022-02-15 RX ADMIN — FUROSEMIDE 20 MG: 10 INJECTION, SOLUTION INTRAMUSCULAR; INTRAVENOUS at 12:43

## 2022-02-15 RX ADMIN — HEPARIN SODIUM 2000 UNITS: 1000 INJECTION, SOLUTION INTRAVENOUS; SUBCUTANEOUS at 19:06

## 2022-02-15 RX ADMIN — Medication 200 MCG/HR: at 13:46

## 2022-02-15 RX ADMIN — SODIUM CHLORIDE 3 G: 900 INJECTION INTRAVENOUS at 17:37

## 2022-02-15 RX ADMIN — FENTANYL CITRATE 100 MCG: 50 INJECTION INTRAMUSCULAR; INTRAVENOUS at 01:51

## 2022-02-15 RX ADMIN — FENTANYL CITRATE 50 MCG: 50 INJECTION INTRAMUSCULAR; INTRAVENOUS at 09:33

## 2022-02-15 RX ADMIN — MIDAZOLAM HYDROCHLORIDE 5 MG: 1 INJECTION, SOLUTION INTRAMUSCULAR; INTRAVENOUS at 14:58

## 2022-02-15 RX ADMIN — NOREPINEPHRINE BITARTRATE 30 MCG/MIN: 1 INJECTION, SOLUTION, CONCENTRATE INTRAVENOUS at 15:01

## 2022-02-15 RX ADMIN — NOREPINEPHRINE BITARTRATE 35 MCG/MIN: 1 INJECTION, SOLUTION, CONCENTRATE INTRAVENOUS at 17:00

## 2022-02-15 RX ADMIN — NOREPINEPHRINE BITARTRATE 5 MCG/MIN: 1 INJECTION, SOLUTION, CONCENTRATE INTRAVENOUS at 07:17

## 2022-02-15 RX ADMIN — PROPOFOL 10 MCG/KG/MIN: 10 INJECTION, EMULSION INTRAVENOUS at 06:03

## 2022-02-15 ASSESSMENT — PAIN DESCRIPTION - PAIN TYPE
TYPE: ACUTE PAIN

## 2022-02-15 NOTE — PROGRESS NOTES
Pulmonary/Critical Care Medicine   Progress Note    Date of service: 2/15/2022  Time: 1500    Patient remained hypotensive despite the addition of vasopressin to maxed Levophed with very marginal UOP throughout the day.  Bedside US did demonstrate a depressed EF.  Dobutamine at a fixed dose of 5 was added.  Eventually, a [employer] Kamla was placed with initial CI of 2.3, pCWP 15, CVP 8, .    Increased Levophed to a max of 50, two amps of Bicarb added without much change.    Will add Unasyn, levophed to 50, and epi added.    Will repeat ABG.    I spent extensive time in reviewing the patient's condition, physical examination, laboratory and imaging data, prior documentation, in discussion with family, RNs, pharmacy and RT in formulating an assessment/plan.    Additional critical care time to myself earlier today: 60 min. No time overlap, procedures not included in time.  [id number] x 2

## 2022-02-15 NOTE — DISCHARGE PLANNING
Care Transition Team Discharge Planning    Anticipated Discharge Disposition: wait for medical stability to assess for d/c needs     Action: Lsw completed chart review, and pt is vented and on heparin drip.    Barriers to Discharge: medical clearance    Plan: Lsw will continue to follow, and assist w/ d/c planning.

## 2022-02-15 NOTE — PROCEDURES
Central Line Insertion    Date/Time: 2/15/2022 3:31 PM  Performed by: Wily Fernandez M.D.  Authorized by: Wily Fernandez M.D.     Consent:     Consent obtained:  Verbal    Consent given by: sister at bedside.    Risks discussed:  Arterial puncture, bleeding, infection and pneumothorax    Alternatives discussed:  No treatment  Pre-procedure details:     Hand hygiene: Hand hygiene performed prior to insertion      Skin preparation:  2% chlorhexidine    Skin preparation agent: Skin preparation agent completely dried prior to procedure    Sedation:     Sedation type: versed, propofol gtt.  Anesthesia:     Anesthesia method:  None  Procedure details:     Location:  R internal jugular    Patient position:  Reverse Trendelenburg    Procedural supplies:  Cordis    Catheter size:  8.5 Fr (10cm)    Landmarks identified: yes      Ultrasound guidance: yes      Sterile ultrasound techniques: Sterile gel and sterile probe covers were used      Number of attempts:  1    Successful placement: yes    Post-procedure details:     Assessment:  Blood return through all ports and free fluid flow    Patient tolerance of procedure:  Tolerated well, no immediate complications  Comments:      US guided right cordis for swan placement 8.5 fr with 10cm confirmed placement prior to dilation. Guide wire removed and confirmed with nurse.

## 2022-02-15 NOTE — CARE PLAN
Respiratory Update     Ventilator Daily Summary    Vent Day #3    Ventilator settings changed this shift: MD settings of 24/420/+8/100%    Weaning trials:no    Respiratory Procedures:none    Plan: Continue current ventilator settings and wean mechanical ventilation as tolerated per physician orders.

## 2022-02-15 NOTE — DIETARY
"Nutrition Support Assessment:  Day 2 of admit.  Mina Dimas is a 46 y.o. male with admitting DX of cardiac arrest.     Current problem list:  1. Cardiogenic shock  2. Acute respiratory failure with hypoxia  3. NSTEMI  4. T2DM  5. Lactic acidosis  6. Acute encephaloapthy     Assessment:  Estimated Nutritional Needs based on:   Height: 172.7 cm (5' 7.99\")  Weight: 74.3 kg (163 lb 12.8 oz)  Weight to Use in Calculations: 74.3 kg (163 lb 12.8 oz)  Ideal Body Weight: 69.9 kg (154 lb)  Body mass index is 24.91 kg/m²., BMI classification: WNL.     Calculation/Equation: MSJ x 1.2 = 1912 kcal.  PSU = 1872 kcal (VE: 11.6, Tmax over the past 24 hours: 37.1).   Total Calories / day: 1900 - 2150 (Calories / k - 29)  Total Grams Protein / day: 96 - 111 (Grams Protein / k.3 - 1.5)     Evaluation:   1. Pt remains on the vent, consult for TF recommendations.    2. Gastric Cortrak in place for enteral access.  3. Current clinical picture and MD progress notes reviewed in addition to Cardiology notes.  Improving neurologic response.  4. EF = 30%.  5. Labs: Glucose: 160, BUN: 33, AST: 136, ALT: 95, Phosphorus: 4.8, TG = 253.  6. Meds: Lasix, Pepcid, SSI, Electrolyte replacement.  Levophed @ 15 mcg/min and Propofol infusion running @ 10 mcg/kg/min (4.6 mL/hr), providing 121 kcal.   7. LBM: 14.  8. Novasource Renal is an appropriate low volume, fiber free formula 2/2 EF, pressors, and meeting estimated nutritional needs.  Will continue to monitor ability to change TF formula.       Malnutrition Risk: Does not meet criteria at this time.      Recommendations/Plan:  1. Initiate Novasource Renal @ 25 mL/hr and advance per protocol to goal rate of 40 mL/hr, providing 1920 kcal, 87 gm protein (1.2 gm protein/kg), 176 gm CHO, and 691 mL of free water per day.  2. Fluids per MD - note Novasource Renal is a low volume formula.  3. Continue to monitor wt.  4. RD continues to monitor labs and Propofol infusion - not adjusting " for low dose Propofol @ this time.    RD follows.

## 2022-02-15 NOTE — PROGRESS NOTES
Critical Care Progress Note    Date of admission  2/13/2022    Chief Complaint  46 y.o. male admitted 2/13/2022 with out of hospital cardiac arrest    Hospital Course  Mr. Dimas is a 46 year old male with the past medical history of CAD and diabetes [place]er CPR and defibrillated out of VF/VT.  The patient was intubated and brought to [facility].  Cardiology is following and started on heparin drip and aspirin.  2/14 - VD#2, ?seizure, troponin to 1987, low UOP    Interval Problem Update  Reviewed last 24 hour events:   - no events overnight   - wakes and following with SAT   - Prop of 5-10   - fent at 100   - RASS of +3   - -120s   - SBP 80-90s   - Levo at 12   - Afebrile   - right nare core track   - BMS in place with no output   - Rich with 120cc overnight UOP   - heparin drip   - ASV at 140/8/100%   - CXR( reviewed): worsening bilateral opacitites   - heparin gtt, asa, stain   - pepcid   - -150s   - lytes ok, K 4.4   - creat 1.32    Yesterday's Events:   - (+)cough/gag   - RASS -1 to +3   - Prop at 10   - fent at 100   - ? seizure this am, EEG   - SR/ST 90-110s   - -110s   - tmax 37.2   - multiple watery stools   - UOP of 375cc overnight, rich   - rihgt IJ TLC   - vent day #2   - CXR(reviewd): clear   - heparin gtt, asa, statin   - pepcid   - no abx   - covid negative   - Mg 1.8   - K 3.7   - -250s   - lactic acid 2.5-->5.4   - WBCs 17   - troponin 82-->415-->1987    Review of Systems  Review of Systems   Unable to perform ROS: Intubated        Vital Signs for last 24 hours   Pulse:  [] 109  Resp:  [9-22] 9  BP: ()/(53-79) 66/53  SpO2:  [92 %-100 %] 96 %    Hemodynamic parameters for last 24 hours       Respiratory Information for the last 24 hours  Vent Mode: ASV  Rate (breaths/min): 24  Vt Target (mL): 420  PEEP/CPAP: 8  MAP: 11  Control VTE (exp VT): 794    Physical Exam   Physical Exam  Vitals and nursing note reviewed.    Constitutional:       Appearance: He is obese. He is ill-appearing.   HENT:      Head: Normocephalic and atraumatic.      Right Ear: External ear normal.      Left Ear: External ear normal.      Nose: Nose normal. No rhinorrhea.      Mouth/Throat:      Mouth: Mucous membranes are moist.      Comments: ETT in place  Eyes:      General: No scleral icterus.     Conjunctiva/sclera: Conjunctivae normal.      Comments: Pupils 2mm and reactive   Cardiovascular:      Rate and Rhythm: Regular rhythm. Tachycardia present.      Pulses: Normal pulses.      Heart sounds: Normal heart sounds. No murmur heard.      Pulmonary:      Breath sounds: No wheezing.      Comments: Breathing comfortably on the vent, clear throughout  Chest:      Chest wall: No tenderness.   Abdominal:      General: There is no distension.      Palpations: Abdomen is soft.      Tenderness: There is no abdominal tenderness. There is no guarding or rebound.   Musculoskeletal:         General: Normal range of motion.      Cervical back: Normal range of motion and neck supple.      Right lower leg: No edema.      Left lower leg: No edema.   Lymphadenopathy:      Cervical: No cervical adenopathy.   Skin:     General: Skin is warm and dry.      Capillary Refill: Capillary refill takes less than 2 seconds.      Findings: No rash.   Neurological:      Sensory: No sensory deficit.      Motor: No weakness.      Comments: Opens eyes to verbal, ? Squeezing hands on commands, ? tracking   Psychiatric:      Comments: Unable to assess         Medications  Current Facility-Administered Medications   Medication Dose Route Frequency Provider Last Rate Last Admin   • norepinephrine (Levophed) 8 mg in 250 mL NS infusion (premix)  0-30 mcg/min Intravenous Continuous Anita Aguilera M.D. 9.4 mL/hr at 02/15/22 0717 5 mcg/min at 02/15/22 0717   • Respiratory Therapy Consult   Nebulization Continuous RT Juan Pablo Garcia M.D.       • famotidine (PEPCID) tablet 20 mg  20 mg  Enteral Tube Q12HRS Juan Pablo Garcia M.D.   20 mg at 02/14/22 1833    Or   • famotidine (PEPCID) injection 20 mg  20 mg Intravenous Q12HRS Juan Pablo Garcia M.D.   20 mg at 02/15/22 0558   • senna-docusate (PERICOLACE or SENOKOT S) 8.6-50 MG per tablet 2 Tablet  2 Tablet Enteral Tube BID Juan Pablo Garcia M.D.   2 Tablet at 02/15/22 0558    And   • polyethylene glycol/lytes (MIRALAX) PACKET 1 Packet  1 Packet Enteral Tube QDAY PRN Juan Pablo Garcia M.D.        And   • magnesium hydroxide (MILK OF MAGNESIA) suspension 30 mL  30 mL Enteral Tube QDAY PRN Juan Pablo Garcia M.D.        And   • bisacodyl (DULCOLAX) suppository 10 mg  10 mg Rectal QDAY PRN Juan Pablo Garcia M.D.       • MD Alert...ICU Electrolyte Replacement per Pharmacy   Other PHARMACY TO DOSE Juan Pablo Garcia M.D.       • lidocaine (XYLOCAINE) 1 % injection 2 mL  2 mL Tracheal Tube Q30 MIN PRN Juan Pablo Garcia M.D.       • Pharmacy Consult: Enteral tube insertion - review meds/change route/product selection  1 Each Other PHARMACY TO DOSE Juan Pablo Garcia M.D.       • fentaNYL (SUBLIMAZE) injection 50 mcg  50 mcg Intravenous Q15 MIN PRN Juan Pablo Garcia M.D.        And   • fentaNYL (SUBLIMAZE) injection 100 mcg  100 mcg Intravenous Q15 MIN PRN Juan Pablo Garcia M.D.   100 mcg at 02/15/22 0151    And   • fentaNYL (SUBLIMAZE) 50 mcg/mL in 50mL (Continuous Infusion)   Intravenous Continuous Juan Pablo Garcia M.D. 4 mL/hr at 02/15/22 0701 200 mcg/hr at 02/15/22 0701    And   • propofol (DIPRIVAN) injection  0-40 mcg/kg/min Intravenous Continuous Juan Pablo Garcia M.D. 4.6 mL/hr at 02/15/22 0603 10 mcg/kg/min at 02/15/22 0603   • ipratropium-albuterol (DUONEB) nebulizer solution  3 mL Nebulization Q2HRS PRN (RT) Juan Pablo Garcia M.D.       • atorvastatin (LIPITOR) tablet 80 mg  80 mg Enteral Tube Q EVENING Christopher Keller, M.D.   80 mg at 02/14/22 1836   • Pharmacy Consult: pharmacy to discontinue all other orders for acetaminophen   Other PHARMACY TO DOSE  Christopher Keller M.D.       • meperidine (DEMEROL) injection 25 mg  25 mg Intravenous Q HOUR PRN Christopher Keller M.D.   25 mg at 02/14/22 2335   • acetaminophen (TYLENOL) tablet 1,000 mg  1,000 mg Enteral Tube Q6HRS Anita Aguilera M.D.   1,000 mg at 02/15/22 0558   • busPIRone (BUSPAR) tablet 15 mg  15 mg Enteral Tube BID Anita Aguilera M.D.   15 mg at 02/15/22 0559   • aspirin (ASA) chewable tab 81 mg  81 mg Enteral Tube DAILY Anita Aguilera M.D.   81 mg at 02/15/22 0558   • LORazepam (ATIVAN) injection 2-4 mg  2-4 mg Intravenous Q2 MIN PRN Anita Aguilera M.D.   2 mg at 02/14/22 0844   • insulin regular (HumuLIN R,NovoLIN R) injection  3-14 Units Subcutaneous Q6HRS Anita Aguilera M.D.   3 Units at 02/14/22 1843    And   • dextrose 50% (D50W) injection 50 mL  50 mL Intravenous Q15 MIN PRN Anita Aguilera M.D.       • heparin infusion 25,000 units in 500 mL 0.45% NACL  0-30 Units/kg/hr Intravenous Continuous Rupesh Canseco M.D. 18.5 mL/hr at 02/15/22 0506 12 Units/kg/hr at 02/15/22 0506   • heparin injection 2,000 Units  2,000 Units Intravenous PRN Rupesh Canseco M.D.   2,000 Units at 02/15/22 0720       Fluids    Intake/Output Summary (Last 24 hours) at 2/15/2022 0730  Last data filed at 2/15/2022 0350  Gross per 24 hour   Intake 527.29 ml   Output 398 ml   Net 129.29 ml       Laboratory  Recent Labs     02/14/22  0159 02/15/22  0253   ISTATAPH 7.353* 7.170*   ISTATAPCO2 37.9* 57.7*   ISTATAPO2 190* 52*   ISTATATCO2 22 23   [id number] 100* 76*   ISTATARTHCO3 21.1 21.0   ISTATARTBE -4 -8*   ISTATTEMP 37.0 C 36.8 C   ISTATFIO2 100 60   ISTATSPEC Arterial Arterial   ISTATAPHTC 7.353* 7.172*   NOCPXLCC2JF 190* 52*         Recent Labs     02/13/22  2140 02/14/22  0150 02/14/22  0315 02/15/22  0550   SODIUM 138 139  --  139   POTASSIUM 3.5* 3.7  --  4.4   CHLORIDE 100 105  --  105   CO2 17* 17*  --  18*   BUN 15 19  --  33*   CREATININE 0.95 0.70  --  1.32   MAGNESIUM  --  1.6 1.8 2.2   PHOSPHORUS  --  2.9 3.3  4.8*   CALCIUM 9.2 8.5  --  8.2*     Recent Labs     02/13/22 2140 02/14/22 0150 02/14/22  0550 02/15/22  0550   ALTSGPT 133* 143*  --  95*   ASTSGOT 207* 190*  --  136*   ALKPHOSPHAT 89 74  --  72   TBILIRUBIN 0.5 0.5  --  0.7   PREALBUMIN  --   --  8.4*  --    GLUCOSE 255* 233*  --  160*     Recent Labs     02/13/22  2140 02/14/22  0150 02/15/22  0550   WBC 17.2*  --  9.3   NEUTSPOLYS 67.00  --   --    LYMPHOCYTES 20.00*  --   --    MONOCYTES 10.40  --   --    EOSINOPHILS 0.90  --   --    BASOPHILS 0.00  --   --    ASTSGOT 207* 190* 136*   ALTSGPT 133* 143* 95*   ALKPHOSPHAT 89 74 72   TBILIRUBIN 0.5 0.5 0.7     Recent Labs     02/13/22  2140 02/14/22  0150 02/15/22  0550   RBC 5.16  --  5.32   HEMOGLOBIN 16.6  --  17.3   HEMATOCRIT 47.9  --  51.4   PLATELETCT 249  --  210   PROTHROMBTM  --  14.8*  --    APTT  --  137.2*  --    INR  --  1.20*  --        Imaging  ECHO:  Severely reduced left ventricular systolic function. The left   ventricular ejection fraction is visually estimated to be 30%. Global   hypokinesis with regional variation and apical akinesis  The right ventricle is normal in size and systolic function.  No significant valvular abnormalities.  No prior study is available for comparison. Cardiology team following   the patient, aware of results.    Assessment/Plan  * Cardiac arrest (HCC)  Assessment & Plan  Out of hospital cardiac arrest due to NSTEMI  Cont supportive therapy  Monitor for arrythmia  Mg goal > 2, K goal > 4  Cardiology following  Normothermia protocols.    Transaminitis- (present on admission)  Assessment & Plan  Improving  Likely related to shock liver from cardiac arrest    Hypomagnesemia- (present on admission)  Assessment & Plan  Replete to goal > 2    Acute encephalopathy- (present on admission)  Assessment & Plan  ? Downtime and ?hypoxic injury-->seems to be improving daily  Monitor for seizures  EEG pending  Neuro checks    NSTEMI (non-ST elevated myocardial infarction)  ([facility])- (present on admission)  Assessment & Plan  CAD hx  Heparin drip ongoing  ASA, high intensity statin  Cardiology following-->cath and AICD once hemodynamically more stable    Lactic acidosis  Assessment & Plan  Due to cardiac arrest  Improved     Type 2 diabetes mellitus (HCC)  Assessment & Plan  BG goals 140-180s-->improved control  High ISS  Monitor need for long-acting insulin    Acute respiratory failure with hypoxia (HCC)  Assessment & Plan  Intubated on 2/13  Cont full vent support  RT/O2 protocols  Vent bundle protocols  I am actively adjusting vent settings based on ABGs  SAT/SBTs.    Cardiogenic shock (HCC)  Assessment & Plan  EF of 30% on stat echocardiogram, w/ history of MI but no reported history of heart failure. Likely due to ACS.  Cont vasopressor for MAP goals > 65  ? Need for dobutamine            VTE:  Heparin  Ulcer: H2 Antagonist  Lines: Central Line  Ongoing indication addressed and Root Catheter  Ongoing indication addressed    I have performed a physical exam and reviewed and updated ROS and Plan today (2/15/2022). In review of yesterday's note (2/14/2022), there are no changes except as documented above.     Discussed patient condition and risk of morbidity and/or mortality with RN, RT, Pharmacy, Charge nurse / hot rounds, Patient and cardiology    The patient remains critically ill.  I have assessed and reassessed the respiratory status and made ventilator adjustments based upon arterial blood gas analysis, ventilator waveforms and airway mechanics.  I have assessed and reassessed the blood pressure, hemodynamics, cardiovascular status. This patient remains at high risk for worsening cardiopulmonary dysfunction and death without the above critical care interventions.     Additional critical care time to Dr. Castillo from earlier today= 90 minutes in directly providing and coordinating critical care and extensive data review.  No time overlap and excludes procedures.

## 2022-02-15 NOTE — PROGRESS NOTES
Overnight critical care progress note    Called by radiology a.m. chest x-ray showing small pneumomediastinum.   Went to bedside to evaluate patient, no hemodynamic changes on minimal pressors.     Discussed with RT changed oxygen to 100%, and decreased PEEP to 8.  To hopefully encourage reabsorption of pneumomediastinum    Chest x-ray repeat 8 AM    Notably ABG this morning also significantly acidotic, increased respiratory rate  Repeat ABG 8 AM    Total critical care time 30 minutes, including discussing imaging with radiology, bedside evaluation for signs of significant pneumomediastinum and discussing with RT

## 2022-02-15 NOTE — PROGRESS NOTES
Dr. Aguilera notified at 1412 that patient was unresponsive, cough and gag absent. Patient was no longer moving withdrawing to pain. Sedation was turned off at 1405 to assess neuro. Notified that urine output was only 18mL for the last two hours. Dr. Aguilera came to bedside to assess patient.    Patient with RASS of +4 at 1525, unable to console patient in Latvian or English. Purposeful movement from all 4 extremities. Positive cough and gag. Sedation restarted.

## 2022-02-15 NOTE — CARE PLAN
The patient is Watcher - Medium risk of patient condition declining or worsening    Shift Goals  Clinical Goals: improved nuero status, vent compliance, wean sedation  Patient Goals: EULALIO  Family Goals: EULALIO    Progress made toward(s) clinical / shift goals:    Problem: Safety - Medical Restraint  Goal: Remains free of injury from restraints (Restraint for Interference with Medical Device)  Outcome: Progressing  Goal: Free from restraint(s) (Restraint for Interference with Medical Device)  Outcome: Progressing     Problem: Skin Integrity  Goal: Skin integrity is maintained or improved  Outcome: Progressing

## 2022-02-15 NOTE — PROGRESS NOTES
Dr. Long and Dr. Aguilera at bedside for swan insertion.  Time out at 1501. Allergies verified, all agreed.    1509: [place] primed.     1517: swan inserted. [place] visualized going through the ventricle    1519: [place] power flushed    1519: [place] wedged. Balloon deflated and swan successfully de-wedged.     1520: [place] inserted at 60cm.    1522: Guidewire removed. Xray ordered.      1523: procedure end.

## 2022-02-15 NOTE — PROGRESS NOTES
Cardiology Follow Up Progress Note    Date of Service  2/15/2022    Attending Physician  Anita Aguilera M.D.    Chief Complaint   Status post arrest, hypotension on pressor support, dilated cardiomyopathy.     HPI  Mina Dimas is a 46 y.o. male admitted 2/13/2022 with above.    Interim Events  No significant changes noted from cardiac standpoint within the past 24 hours. He apparently has improving neurological response.    Review of Systems  Review of Systems   Unable to perform ROS: Intubated       Vital signs in last 24 hours  Pulse:  [] 59  Resp:  [8-22] 8  BP: ()/(52-79) 76/54  SpO2:  [92 %-100 %] 92 %    Physical Exam  Physical Exam  Constitutional:       Interventions: He is sedated and intubated.   HENT:      Head: Normocephalic and atraumatic.   Eyes:      Comments: Closed.   Cardiovascular:      Rate and Rhythm: Normal rate and regular rhythm.   Pulmonary:      Effort: He is intubated.      Breath sounds: Normal breath sounds.   Abdominal:      General: Bowel sounds are normal.      Palpations: Abdomen is soft.   Skin:     General: Skin is warm and dry.         Lab Review  Lab Results   Component Value Date/Time    WBC 9.3 02/15/2022 05:50 AM    RBC 5.32 02/15/2022 05:50 AM    HEMOGLOBIN 17.3 02/15/2022 05:50 AM    HEMATOCRIT 51.4 02/15/2022 05:50 AM    MCV 96.6 02/15/2022 05:50 AM    MCH 32.5 02/15/2022 05:50 AM    MCHC 33.7 02/15/2022 05:50 AM    MPV 11.1 02/15/2022 05:50 AM      Lab Results   Component Value Date/Time    SODIUM 139 02/15/2022 05:50 AM    POTASSIUM 4.4 02/15/2022 05:50 AM    CHLORIDE 105 02/15/2022 05:50 AM    CO2 18 (L) 02/15/2022 05:50 AM    GLUCOSE 160 (H) 02/15/2022 05:50 AM    BUN 33 (H) 02/15/2022 05:50 AM    CREATININE 1.32 02/15/2022 05:50 AM      Lab Results   Component Value Date/Time    ASTSGOT 136 (H) 02/15/2022 05:50 AM    ALTSGPT 95 (H) 02/15/2022 05:50 AM     Lab Results   Component Value Date/Time    CHOLSTRLTOT 114 02/15/2022 05:50 AM    LDL 23  02/15/2022 05:50 AM    HDL 40 02/15/2022 05:50 AM    TRIGLYCERIDE 253 (H) 02/15/2022 05:50 AM    TROPONINT 1987 (H) 02/14/2022 05:50 AM       No results for input(s): NTPROBNP in the last 72 hours.    Cardiac Imaging and Procedures Review  CARDIAC STUDIES/PROCEDURES:     ECHOCARDIOGRAM CONCLUSIONS (02/14/22)  Severely reduced left ventricular systolic function.   The left ventricular ejection fraction is visually estimated to be 30%.   Global hypokinesis with regional variation and apical akinesis.  The right ventricle is normal in size and systolic function.  No significant valvular abnormalities.  No prior study is available for comparison. Cardiology team following   the patient, aware of results.     EKG performed on (02/13/21) EKGshows sinus rhythm with non-specific intra-ventricular conduction delay.    Assessment/Plan  1. Out of hospital arrest with dilated cardiomyopathy: He is clinically improving . He will be scheduled for cardiac catheterization and AICD placement when he is awake and off pressors.  2. Hypotension, on pressor support.      Thank you for allowing me to participate in the care of this patient.  I will continue to follow this patient    Please contact me with any questions.    Chapin May M.D.   Cardiologist, [facility]  [phone]

## 2022-02-15 NOTE — PROCEDURES
Procedure: Cope-Kamla Catheter Insertion  Indication: cardiogenic shock  Consent: sister at bedside    Procedure: Appropriate time out was taken, patient was on cardiac monitor and was prepped and draped in sterile fashion. Selinger technique was preformed to place cordis using sterile ultrasound probe in real time technique and confirmed placement of guidewire. All ports were flushed and tested prior PA catheter insertion. The PA catheter was inserted watching for arrhythmia and wave forms of CVP, RV and step up into pulmonary artery until wedge was achieved and wedge re-tested and catheter secured at 60 cm and locked in place. Always practicing only advancing balloon in the up position and withdrawing with balloon down.       Full parameters will be taken    Chest xray ordered to confirm placement into west zone 2 and no complication    Patient tolerate procedure well without any complication and blood loss was < 5 ml    Code # [id number]    Wily Fernandez MD  Critical Care Medicine

## 2022-02-15 NOTE — CARE PLAN
The patient is Watcher - Medium risk of patient condition declining or worsening    Shift Goals  Clinical Goals: hemodynamic stability, tolerate vent, improvement of neuro status    Patient is not progressing towards the following goals:      Problem: Knowledge Deficit - Standard  Goal: Patient and family/care givers will demonstrate understanding of plan of care, disease process/condition, diagnostic tests and medications  Outcome: Not Progressing     Problem: Hemodynamics  Goal: Patient's hemodynamics, fluid balance and neurologic status will be stable or improve  Outcome: Not Progressing  Note: Patient with RASS of -5 or +4. Moves all 4 extremities during SAT. Purposeful movement, not following commands.      Problem: Safety - Medical Restraint  Goal: Free from restraint(s) (Restraint for Interference with Medical Device)  Outcome: Not Progressing

## 2022-02-15 NOTE — PROCEDURES
"Arterial Line Insertion    Date/Time: 2/15/2022 12:22 PM  Performed by: Anita Aguilera M.D.  Authorized by: Anita Aguilera M.D.   Consent: Verbal consent obtained.  Risks and benefits: risks, benefits and alternatives were discussed  Consent given by: power of   Patient identity confirmed: arm band  Time out: Immediately prior to procedure a \"time out\" was called to verify the correct patient, procedure, equipment, support staff and site/side marked as required.  Preparation: Patient was prepped and draped in the usual sterile fashion.  Indications: multiple ABGs, respiratory failure and hemodynamic monitoring  Location: right axillary artery.  Anesthesia: local infiltration    Anesthesia:  Local Anesthetic: lidocaine 1% without epinephrine  Anesthetic total: 3 mL    Sedation:  Patient sedated: yes  Sedation type: moderate (conscious) sedation  Sedatives: propofol  Analgesia: fentanyl  Vitals: Vital signs were monitored during sedation.    Nasir's test normal: yes  Needle gauge: 18  Seldinger technique: Seldinger technique used  Number of attempts: 1  Post-procedure: line sutured and dressing applied  Post-procedure CMS: normal  Patient tolerance: patient tolerated the procedure well with no immediate complications              "

## 2022-02-16 ENCOUNTER — APPOINTMENT (OUTPATIENT)
Dept: CARDIOLOGY | Facility: MEDICAL CENTER | Age: 47
DRG: 853 | End: 2022-02-16
Attending: INTERNAL MEDICINE
Payer: COMMERCIAL

## 2022-02-16 ENCOUNTER — APPOINTMENT (OUTPATIENT)
Dept: CARDIOLOGY | Facility: MEDICAL CENTER | Age: 47
DRG: 853 | End: 2022-02-16
Attending: NURSE PRACTITIONER
Payer: COMMERCIAL

## 2022-02-16 ENCOUNTER — APPOINTMENT (OUTPATIENT)
Dept: RADIOLOGY | Facility: MEDICAL CENTER | Age: 47
DRG: 853 | End: 2022-02-16
Attending: INTERNAL MEDICINE
Payer: COMMERCIAL

## 2022-02-16 PROBLEM — R57.0 CARDIOGENIC SHOCK (HCC): Status: ACTIVE | Noted: 2022-02-16

## 2022-02-16 PROBLEM — N17.9 AKI (ACUTE KIDNEY INJURY) (HCC): Status: ACTIVE | Noted: 2022-02-16

## 2022-02-16 PROBLEM — A41.9 SEPTIC SHOCK (HCC): Status: ACTIVE | Noted: 2022-02-16

## 2022-02-16 PROBLEM — R57.9 SHOCK (HCC): Status: ACTIVE | Noted: 2022-02-14

## 2022-02-16 PROBLEM — R65.21 SEPTIC SHOCK (HCC): Status: ACTIVE | Noted: 2022-02-16

## 2022-02-16 LAB
ACT BLD: 166 SEC (ref 74–137)
ACT BLD: 172 SEC (ref 74–137)
ACT BLD: 190 SEC (ref 74–137)
ACT BLD: 202 SEC (ref 74–137)
ACT BLD: 225 SEC (ref 74–137)
ACT BLD: 226 SEC (ref 74–137)
ACT BLD: 255 SEC (ref 74–137)
ACT BLD: 273 SEC (ref 74–137)
ACT BLD: 291 SEC (ref 74–137)
ALBUMIN SERPL BCP-MCNC: 2.6 G/DL (ref 3.2–4.9)
ALBUMIN SERPL BCP-MCNC: 2.7 G/DL (ref 3.2–4.9)
ALBUMIN/GLOB SERPL: 1 G/DL
ALBUMIN/GLOB SERPL: 1 G/DL
ALP SERPL-CCNC: 46 U/L (ref 30–99)
ALP SERPL-CCNC: 46 U/L (ref 30–99)
ALT SERPL-CCNC: 74 U/L (ref 2–50)
ALT SERPL-CCNC: 93 U/L (ref 2–50)
ANION GAP SERPL CALC-SCNC: 12 MMOL/L (ref 7–16)
ANION GAP SERPL CALC-SCNC: 17 MMOL/L (ref 7–16)
ANION GAP SERPL CALC-SCNC: 17 MMOL/L (ref 7–16)
APPEARANCE UR: ABNORMAL
APTT PPP: 90 SEC (ref 24.7–36)
AST SERPL-CCNC: 173 U/L (ref 12–45)
AST SERPL-CCNC: 349 U/L (ref 12–45)
BACTERIA #/AREA URNS HPF: NEGATIVE /HPF
BASE EXCESS BLDA CALC-SCNC: -2 MMOL/L (ref -4–3)
BASE EXCESS BLDA CALC-SCNC: -3 MMOL/L (ref -4–3)
BASE EXCESS BLDA CALC-SCNC: -3 MMOL/L (ref -4–3)
BASE EXCESS BLDA CALC-SCNC: -5 MMOL/L (ref -4–3)
BILIRUB SERPL-MCNC: 0.6 MG/DL (ref 0.1–1.5)
BILIRUB SERPL-MCNC: 0.8 MG/DL (ref 0.1–1.5)
BILIRUB UR QL STRIP.AUTO: NEGATIVE
BODY TEMPERATURE: ABNORMAL DEGREES
BODY TEMPERATURE: NORMAL DEGREES
BREATHS SETTING VENT: 28
BREATHS SETTING VENT: 28
BREATHS SETTING VENT: 32
BREATHS SETTING VENT: 32
BUN SERPL-MCNC: 37 MG/DL (ref 8–22)
BUN SERPL-MCNC: 42 MG/DL (ref 8–22)
BUN SERPL-MCNC: 45 MG/DL (ref 8–22)
CA-I BLD ISE-SCNC: 1.03 MMOL/L (ref 1.1–1.3)
CA-I SERPL-SCNC: 1.1 MMOL/L (ref 1.1–1.3)
CALCIUM SERPL-MCNC: 7 MG/DL (ref 8.5–10.5)
CALCIUM SERPL-MCNC: 7.6 MG/DL (ref 8.5–10.5)
CALCIUM SERPL-MCNC: 8.4 MG/DL (ref 8.5–10.5)
CFT BLD TEG: >17 MIN (ref 4.6–9.1)
CFT P HPASE BLD TEG: 8 MIN (ref 4.3–8.3)
CHLORIDE SERPL-SCNC: 103 MMOL/L (ref 96–112)
CHLORIDE SERPL-SCNC: 103 MMOL/L (ref 96–112)
CHLORIDE SERPL-SCNC: 104 MMOL/L (ref 96–112)
CLOT ANGLE BLD TEG: 45.6 DEGREES (ref 63–78)
CO2 BLDA-SCNC: 20 MMOL/L (ref 20–33)
CO2 BLDA-SCNC: 22 MMOL/L (ref 20–33)
CO2 BLDA-SCNC: 24 MMOL/L (ref 20–33)
CO2 BLDA-SCNC: 25 MMOL/L (ref 20–33)
CO2 SERPL-SCNC: 18 MMOL/L (ref 20–33)
CO2 SERPL-SCNC: 20 MMOL/L (ref 20–33)
CO2 SERPL-SCNC: 21 MMOL/L (ref 20–33)
COLOR UR: YELLOW
CORTIS SERPL-MCNC: 226 UG/DL (ref 0–23)
CREAT SERPL-MCNC: 1 MG/DL (ref 0.5–1.4)
CREAT SERPL-MCNC: 1.4 MG/DL (ref 0.5–1.4)
CREAT SERPL-MCNC: 1.47 MG/DL (ref 0.5–1.4)
CT.EXTRINSIC BLD ROTEM: >5 MIN (ref 0.8–2.1)
DELSYS IDSYS: ABNORMAL
DELSYS IDSYS: NORMAL
EKG IMPRESSION: NORMAL
END TIDAL CARBON DIOXIDE IECO2: 25 MMHG
END TIDAL CARBON DIOXIDE IECO2: 26 MMHG
END TIDAL CARBON DIOXIDE IECO2: 27 MMHG
END TIDAL CARBON DIOXIDE IECO2: 36 MMHG
EPI CELLS #/AREA URNS HPF: ABNORMAL /HPF
ERYTHROCYTE [DISTWIDTH] IN BLOOD BY AUTOMATED COUNT: 42.6 FL (ref 35.9–50)
ERYTHROCYTE [DISTWIDTH] IN BLOOD BY AUTOMATED COUNT: 42.7 FL (ref 35.9–50)
GLOBULIN SER CALC-MCNC: 2.6 G/DL (ref 1.9–3.5)
GLOBULIN SER CALC-MCNC: 2.7 G/DL (ref 1.9–3.5)
GLUCOSE BLD-MCNC: 124 MG/DL (ref 65–99)
GLUCOSE BLD-MCNC: 204 MG/DL (ref 65–99)
GLUCOSE BLD-MCNC: 210 MG/DL (ref 65–99)
GLUCOSE BLD-MCNC: 216 MG/DL (ref 65–99)
GLUCOSE BLD-MCNC: 223 MG/DL (ref 65–99)
GLUCOSE BLD-MCNC: 253 MG/DL (ref 65–99)
GLUCOSE BLD-MCNC: 259 MG/DL (ref 65–99)
GLUCOSE BLD-MCNC: 260 MG/DL (ref 65–99)
GLUCOSE BLD-MCNC: 270 MG/DL (ref 65–99)
GLUCOSE BLD-MCNC: 282 MG/DL (ref 65–99)
GLUCOSE BLD-MCNC: 293 MG/DL (ref 65–99)
GLUCOSE SERPL-MCNC: 273 MG/DL (ref 65–99)
GLUCOSE SERPL-MCNC: 325 MG/DL (ref 65–99)
GLUCOSE SERPL-MCNC: 328 MG/DL (ref 65–99)
GLUCOSE UR STRIP.AUTO-MCNC: 250 MG/DL
GRAM STN SPEC: NORMAL
HCO3 BLDA-SCNC: 19 MMOL/L (ref 17–25)
HCO3 BLDA-SCNC: 21.1 MMOL/L (ref 17–25)
HCO3 BLDA-SCNC: 22.5 MMOL/L (ref 17–25)
HCO3 BLDA-SCNC: 23.8 MMOL/L (ref 17–25)
HCT VFR BLD AUTO: 34.7 % (ref 42–52)
HCT VFR BLD AUTO: 38.5 % (ref 42–52)
HCT VFR BLD CALC: 40 % (ref 42–52)
HGB BLD-MCNC: 11.8 G/DL (ref 14–18)
HGB BLD-MCNC: 13.1 G/DL (ref 14–18)
HGB BLD-MCNC: 13.6 G/DL (ref 14–18)
HOROWITZ INDEX BLDA+IHG-RTO: 128 MM[HG]
HOROWITZ INDEX BLDA+IHG-RTO: 132 MM[HG]
HOROWITZ INDEX BLDA+IHG-RTO: 81 MM[HG]
HOROWITZ INDEX BLDA+IHG-RTO: 93 MM[HG]
INR PPP: 1.67 (ref 0.87–1.13)
INR PPP: 1.71 (ref 0.87–1.13)
KETONES UR STRIP.AUTO-MCNC: ABNORMAL MG/DL
LACTATE BLD-SCNC: 2.4 MMOL/L (ref 0.5–2)
LACTATE BLD-SCNC: 2.7 MMOL/L (ref 0.5–2)
LACTATE BLD-SCNC: 2.9 MMOL/L (ref 0.5–2)
LACTATE BLD-SCNC: 3.6 MMOL/L (ref 0.5–2)
LACTATE BLD-SCNC: 4.7 MMOL/L (ref 0.5–2)
LEUKOCYTE ESTERASE UR QL STRIP.AUTO: NEGATIVE
LV EJECT FRACT  99904: 10
LV EJECT FRACT  99904: 20
LV EJECT FRACT MOD 2C 99903: 17.54
LV EJECT FRACT MOD 4C 99902: 34.04
LV EJECT FRACT MOD BP 99901: 24.99
MAGNESIUM SERPL-MCNC: 2.1 MG/DL (ref 1.5–2.5)
MAGNESIUM SERPL-MCNC: 2.2 MG/DL (ref 1.5–2.5)
MAGNESIUM SERPL-MCNC: 2.3 MG/DL (ref 1.5–2.5)
MCF BLD TEG: 62.6 MM (ref 52–69)
MCF.PLATELET INHIB BLD ROTEM: 35.9 MM (ref 15–32)
MCH RBC QN AUTO: 31.1 PG (ref 27–33)
MCH RBC QN AUTO: 31.4 PG (ref 27–33)
MCHC RBC AUTO-ENTMCNC: 34 G/DL (ref 33.7–35.3)
MCHC RBC AUTO-ENTMCNC: 34 G/DL (ref 33.7–35.3)
MCV RBC AUTO: 91.6 FL (ref 81.4–97.8)
MCV RBC AUTO: 92.3 FL (ref 81.4–97.8)
MICRO URNS: ABNORMAL
MODE IMODE: ABNORMAL
MODE IMODE: NORMAL
NITRITE UR QL STRIP.AUTO: NEGATIVE
NT-PROBNP SERPL IA-MCNC: 7260 PG/ML (ref 0–125)
O2/TOTAL GAS SETTING VFR VENT: 100 %
O2/TOTAL GAS SETTING VFR VENT: 100 %
O2/TOTAL GAS SETTING VFR VENT: 70 %
O2/TOTAL GAS SETTING VFR VENT: 70 %
PA AA BLD-ACNC: 26.2 % (ref 0–11)
PA ADP BLD-ACNC: 26 % (ref 0–17)
PCO2 BLDA: 30.1 MMHG (ref 26–37)
PCO2 BLDA: 32.4 MMHG (ref 26–37)
PCO2 BLDA: 40.8 MMHG (ref 26–37)
PCO2 BLDA: 50.1 MMHG (ref 26–37)
PCO2 TEMP ADJ BLDA: 30 MMHG (ref 26–37)
PCO2 TEMP ADJ BLDA: 33 MMHG (ref 26–37)
PCO2 TEMP ADJ BLDA: 40.4 MMHG (ref 26–37)
PCO2 TEMP ADJ BLDA: 51 MMHG (ref 26–37)
PEEP END EXPIRATORY PRESSURE IPEEP: 10 CMH20
PEEP END EXPIRATORY PRESSURE IPEEP: 10 CMH20
PEEP END EXPIRATORY PRESSURE IPEEP: 12 CMH20
PEEP END EXPIRATORY PRESSURE IPEEP: 12 CMH20
PH BLDA: 7.29 [PH] (ref 7.4–7.5)
PH BLDA: 7.35 [PH] (ref 7.4–7.5)
PH BLDA: 7.41 [PH] (ref 7.4–7.5)
PH BLDA: 7.42 [PH] (ref 7.4–7.5)
PH TEMP ADJ BLDA: 7.28 [PH] (ref 7.4–7.5)
PH TEMP ADJ BLDA: 7.35 [PH] (ref 7.4–7.5)
PH TEMP ADJ BLDA: 7.41 [PH] (ref 7.4–7.5)
PH TEMP ADJ BLDA: 7.42 [PH] (ref 7.4–7.5)
PH UR STRIP.AUTO: 5 [PH] (ref 5–8)
PHOSPHATE SERPL-MCNC: 3.5 MG/DL (ref 2.5–4.5)
PLATELET # BLD AUTO: 131 K/UL (ref 164–446)
PLATELET # BLD AUTO: 142 K/UL (ref 164–446)
PMV BLD AUTO: 11.3 FL (ref 9–12.9)
PMV BLD AUTO: 11.5 FL (ref 9–12.9)
PO2 BLDA: 128 MMHG (ref 64–87)
PO2 BLDA: 132 MMHG (ref 64–87)
PO2 BLDA: 57 MMHG (ref 64–87)
PO2 BLDA: 65 MMHG (ref 64–87)
PO2 TEMP ADJ BLDA: 130 MMHG (ref 64–87)
PO2 TEMP ADJ BLDA: 131 MMHG (ref 64–87)
PO2 TEMP ADJ BLDA: 57 MMHG (ref 64–87)
PO2 TEMP ADJ BLDA: 67 MMHG (ref 64–87)
POTASSIUM BLD-SCNC: 3.1 MMOL/L (ref 3.6–5.5)
POTASSIUM SERPL-SCNC: 3.1 MMOL/L (ref 3.6–5.5)
POTASSIUM SERPL-SCNC: 3.2 MMOL/L (ref 3.6–5.5)
POTASSIUM SERPL-SCNC: 3.4 MMOL/L (ref 3.6–5.5)
POTASSIUM SERPL-SCNC: 3.5 MMOL/L (ref 3.6–5.5)
PROCALCITONIN SERPL-MCNC: 38.64 NG/ML
PROT SERPL-MCNC: 5.2 G/DL (ref 6–8.2)
PROT SERPL-MCNC: 5.4 G/DL (ref 6–8.2)
PROT UR QL STRIP: 100 MG/DL
PROTHROMBIN TIME: 19.2 SEC (ref 12–14.6)
PROTHROMBIN TIME: 19.6 SEC (ref 12–14.6)
RBC # BLD AUTO: 3.79 M/UL (ref 4.7–6.1)
RBC # BLD AUTO: 4.17 M/UL (ref 4.7–6.1)
RBC # URNS HPF: ABNORMAL /HPF
RBC UR QL AUTO: ABNORMAL
SAO2 % BLDA: 90 % (ref 93–99)
SAO2 % BLDA: 93 % (ref 93–99)
SAO2 % BLDA: 98 % (ref 93–99)
SAO2 % BLDA: 99 % (ref 93–99)
SCCMEC + MECA PNL NOSE NAA+PROBE: NEGATIVE
SIGNIFICANT IND 70042: NORMAL
SITE SITE: NORMAL
SODIUM BLD-SCNC: 141 MMOL/L (ref 135–145)
SODIUM SERPL-SCNC: 137 MMOL/L (ref 135–145)
SODIUM SERPL-SCNC: 138 MMOL/L (ref 135–145)
SODIUM SERPL-SCNC: 140 MMOL/L (ref 135–145)
SOURCE SOURCE: NORMAL
SP GR UR STRIP.AUTO: 1.03
SPECIMEN DRAWN FROM PATIENT: ABNORMAL
SPECIMEN DRAWN FROM PATIENT: NORMAL
TEG ALGORITHM TGALG: ABNORMAL
TIDAL VOLUME IVT: 420 ML
UFH PPP CHRO-ACNC: 0.39 IU/ML
UROBILINOGEN UR STRIP.AUTO-MCNC: 0.2 MG/DL
WBC # BLD AUTO: 13.3 K/UL (ref 4.8–10.8)
WBC # BLD AUTO: 8.2 K/UL (ref 4.8–10.8)
WBC #/AREA URNS HPF: ABNORMAL /HPF

## 2022-02-16 PROCEDURE — 92950 HEART/LUNG RESUSCITATION CPR: CPT

## 2022-02-16 PROCEDURE — 93005 ELECTROCARDIOGRAM TRACING: CPT | Performed by: INTERNAL MEDICINE

## 2022-02-16 PROCEDURE — 87641 MR-STAPH DNA AMP PROBE: CPT

## 2022-02-16 PROCEDURE — 82803 BLOOD GASES ANY COMBINATION: CPT

## 2022-02-16 PROCEDURE — 700102 HCHG RX REV CODE 250 W/ 637 OVERRIDE(OP): Performed by: INTERNAL MEDICINE

## 2022-02-16 PROCEDURE — 87070 CULTURE OTHR SPECIMN AEROBIC: CPT

## 2022-02-16 PROCEDURE — 700111 HCHG RX REV CODE 636 W/ 250 OVERRIDE (IP): Performed by: INTERNAL MEDICINE

## 2022-02-16 PROCEDURE — A9270 NON-COVERED ITEM OR SERVICE: HCPCS | Performed by: STUDENT IN AN ORGANIZED HEALTH CARE EDUCATION/TRAINING PROGRAM

## 2022-02-16 PROCEDURE — 700105 HCHG RX REV CODE 258: Performed by: INTERNAL MEDICINE

## 2022-02-16 PROCEDURE — 87040 BLOOD CULTURE FOR BACTERIA: CPT | Mod: 91

## 2022-02-16 PROCEDURE — 82330 ASSAY OF CALCIUM: CPT | Mod: 91

## 2022-02-16 PROCEDURE — 700117 HCHG RX CONTRAST REV CODE 255: Performed by: NURSE PRACTITIONER

## 2022-02-16 PROCEDURE — 83735 ASSAY OF MAGNESIUM: CPT | Mod: 91

## 2022-02-16 PROCEDURE — 93010 ELECTROCARDIOGRAM REPORT: CPT | Performed by: INTERNAL MEDICINE

## 2022-02-16 PROCEDURE — A9270 NON-COVERED ITEM OR SERVICE: HCPCS | Performed by: INTERNAL MEDICINE

## 2022-02-16 PROCEDURE — 33990 INSJ PERQ VAD L HRT ARTERIAL: CPT | Performed by: INTERNAL MEDICINE

## 2022-02-16 PROCEDURE — 71045 X-RAY EXAM CHEST 1 VIEW: CPT

## 2022-02-16 PROCEDURE — 92979 ENDOLUMINL IVUS OCT C EA: CPT | Mod: 26,LC | Performed by: INTERNAL MEDICINE

## 2022-02-16 PROCEDURE — 94799 UNLISTED PULMONARY SVC/PX: CPT

## 2022-02-16 PROCEDURE — 85520 HEPARIN ASSAY: CPT

## 2022-02-16 PROCEDURE — 87077 CULTURE AEROBIC IDENTIFY: CPT

## 2022-02-16 PROCEDURE — 80053 COMPREHEN METABOLIC PANEL: CPT

## 2022-02-16 PROCEDURE — 93458 L HRT ARTERY/VENTRICLE ANGIO: CPT | Mod: 26,59 | Performed by: INTERNAL MEDICINE

## 2022-02-16 PROCEDURE — 85014 HEMATOCRIT: CPT

## 2022-02-16 PROCEDURE — 82533 TOTAL CORTISOL: CPT

## 2022-02-16 PROCEDURE — 700101 HCHG RX REV CODE 250: Performed by: INTERNAL MEDICINE

## 2022-02-16 PROCEDURE — 94003 VENT MGMT INPAT SUBQ DAY: CPT

## 2022-02-16 PROCEDURE — 85576 BLOOD PLATELET AGGREGATION: CPT

## 2022-02-16 PROCEDURE — 92978 ENDOLUMINL IVUS OCT C 1ST: CPT | Mod: 26,LM | Performed by: INTERNAL MEDICINE

## 2022-02-16 PROCEDURE — 33993 REPOSG PERQ R/L HRT VAD: CPT | Mod: 59 | Performed by: INTERNAL MEDICINE

## 2022-02-16 PROCEDURE — 37799 UNLISTED PX VASCULAR SURGERY: CPT

## 2022-02-16 PROCEDURE — B2111ZZ FLUOROSCOPY OF MULTIPLE CORONARY ARTERIES USING LOW OSMOLAR CONTRAST: ICD-10-PCS | Performed by: INTERNAL MEDICINE

## 2022-02-16 PROCEDURE — 83880 ASSAY OF NATRIURETIC PEPTIDE: CPT

## 2022-02-16 PROCEDURE — 4A023N7 MEASUREMENT OF CARDIAC SAMPLING AND PRESSURE, LEFT HEART, PERCUTANEOUS APPROACH: ICD-10-PCS | Performed by: INTERNAL MEDICINE

## 2022-02-16 PROCEDURE — 87147 CULTURE TYPE IMMUNOLOGIC: CPT

## 2022-02-16 PROCEDURE — 87205 SMEAR GRAM STAIN: CPT

## 2022-02-16 PROCEDURE — A9270 NON-COVERED ITEM OR SERVICE: HCPCS

## 2022-02-16 PROCEDURE — 85730 THROMBOPLASTIN TIME PARTIAL: CPT

## 2022-02-16 PROCEDURE — 700102 HCHG RX REV CODE 250 W/ 637 OVERRIDE(OP)

## 2022-02-16 PROCEDURE — 99233 SBSQ HOSP IP/OBS HIGH 50: CPT | Mod: 25 | Performed by: INTERNAL MEDICINE

## 2022-02-16 PROCEDURE — 302131 K PAD MOTOR: Performed by: INTERNAL MEDICINE

## 2022-02-16 PROCEDURE — 85347 COAGULATION TIME ACTIVATED: CPT

## 2022-02-16 PROCEDURE — 700102 HCHG RX REV CODE 250 W/ 637 OVERRIDE(OP): Performed by: STUDENT IN AN ORGANIZED HEALTH CARE EDUCATION/TRAINING PROGRAM

## 2022-02-16 PROCEDURE — 85027 COMPLETE CBC AUTOMATED: CPT

## 2022-02-16 PROCEDURE — 99291 CRITICAL CARE FIRST HOUR: CPT | Performed by: STUDENT IN AN ORGANIZED HEALTH CARE EDUCATION/TRAINING PROGRAM

## 2022-02-16 PROCEDURE — 84132 ASSAY OF SERUM POTASSIUM: CPT

## 2022-02-16 PROCEDURE — 85347 COAGULATION TIME ACTIVATED: CPT | Mod: 91

## 2022-02-16 PROCEDURE — 700105 HCHG RX REV CODE 258: Performed by: STUDENT IN AN ORGANIZED HEALTH CARE EDUCATION/TRAINING PROGRAM

## 2022-02-16 PROCEDURE — 700117 HCHG RX CONTRAST REV CODE 255: Performed by: INTERNAL MEDICINE

## 2022-02-16 PROCEDURE — 83605 ASSAY OF LACTIC ACID: CPT | Mod: 91

## 2022-02-16 PROCEDURE — 5A0221D ASSISTANCE WITH CARDIAC OUTPUT USING IMPELLER PUMP, CONTINUOUS: ICD-10-PCS | Performed by: INTERNAL MEDICINE

## 2022-02-16 PROCEDURE — 93308 TTE F-UP OR LMTD: CPT | Mod: 26 | Performed by: INTERNAL MEDICINE

## 2022-02-16 PROCEDURE — 93308 TTE F-UP OR LMTD: CPT

## 2022-02-16 PROCEDURE — 700111 HCHG RX REV CODE 636 W/ 250 OVERRIDE (IP)

## 2022-02-16 PROCEDURE — 87186 SC STD MICRODIL/AGAR DIL: CPT

## 2022-02-16 PROCEDURE — 84100 ASSAY OF PHOSPHORUS: CPT

## 2022-02-16 PROCEDURE — 770022 HCHG ROOM/CARE - ICU (200)

## 2022-02-16 PROCEDURE — 5A2204Z RESTORATION OF CARDIAC RHYTHM, SINGLE: ICD-10-PCS | Performed by: INTERNAL MEDICINE

## 2022-02-16 PROCEDURE — 82962 GLUCOSE BLOOD TEST: CPT | Mod: 91

## 2022-02-16 PROCEDURE — 99292 CRITICAL CARE ADDL 30 MIN: CPT | Performed by: INTERNAL MEDICINE

## 2022-02-16 PROCEDURE — 700111 HCHG RX REV CODE 636 W/ 250 OVERRIDE (IP): Performed by: STUDENT IN AN ORGANIZED HEALTH CARE EDUCATION/TRAINING PROGRAM

## 2022-02-16 PROCEDURE — 85384 FIBRINOGEN ACTIVITY: CPT

## 2022-02-16 PROCEDURE — 99152 MOD SED SAME PHYS/QHP 5/>YRS: CPT | Performed by: INTERNAL MEDICINE

## 2022-02-16 PROCEDURE — 84295 ASSAY OF SERUM SODIUM: CPT

## 2022-02-16 PROCEDURE — C1894 INTRO/SHEATH, NON-LASER: HCPCS

## 2022-02-16 PROCEDURE — 92928 PRQ TCAT PLMT NTRAC ST 1 LES: CPT | Mod: 59,LC | Performed by: INTERNAL MEDICINE

## 2022-02-16 PROCEDURE — 75630 X-RAY AORTA LEG ARTERIES: CPT | Mod: 26,59 | Performed by: INTERNAL MEDICINE

## 2022-02-16 PROCEDURE — 80048 BASIC METABOLIC PNL TOTAL CA: CPT

## 2022-02-16 PROCEDURE — 700101 HCHG RX REV CODE 250: Performed by: STUDENT IN AN ORGANIZED HEALTH CARE EDUCATION/TRAINING PROGRAM

## 2022-02-16 PROCEDURE — 027236Z DILATION OF CORONARY ARTERY, THREE ARTERIES WITH THREE DRUG-ELUTING INTRALUMINAL DEVICES, PERCUTANEOUS APPROACH: ICD-10-PCS | Performed by: INTERNAL MEDICINE

## 2022-02-16 PROCEDURE — 700111 HCHG RX REV CODE 636 W/ 250 OVERRIDE (IP): Performed by: EMERGENCY MEDICINE

## 2022-02-16 PROCEDURE — 81001 URINALYSIS AUTO W/SCOPE: CPT

## 2022-02-16 PROCEDURE — 85610 PROTHROMBIN TIME: CPT | Mod: 91

## 2022-02-16 PROCEDURE — 700101 HCHG RX REV CODE 250

## 2022-02-16 PROCEDURE — 02HA3RZ INSERTION OF SHORT-TERM EXTERNAL HEART ASSIST SYSTEM INTO HEART, PERCUTANEOUS APPROACH: ICD-10-PCS | Performed by: INTERNAL MEDICINE

## 2022-02-16 RX ORDER — PRASUGREL 10 MG/1
10 TABLET, FILM COATED ORAL DAILY
Status: DISCONTINUED | OUTPATIENT
Start: 2022-02-17 | End: 2022-02-17 | Stop reason: HOSPADM

## 2022-02-16 RX ORDER — DEXTROSE MONOHYDRATE 25 G/50ML
25-50 INJECTION, SOLUTION INTRAVENOUS PRN
Status: DISCONTINUED | OUTPATIENT
Start: 2022-02-16 | End: 2022-02-16

## 2022-02-16 RX ORDER — HEPARIN SODIUM 5000 [USP'U]/100ML
0-1000 INJECTION, SOLUTION INTRAVENOUS CONTINUOUS
Status: DISCONTINUED | OUTPATIENT
Start: 2022-02-16 | End: 2022-02-17 | Stop reason: HOSPADM

## 2022-02-16 RX ORDER — ASPIRIN 81 MG/1
TABLET, CHEWABLE ORAL
Status: COMPLETED
Start: 2022-02-16 | End: 2022-02-16

## 2022-02-16 RX ORDER — POTASSIUM CHLORIDE 14.9 MG/ML
20 INJECTION INTRAVENOUS ONCE
Status: COMPLETED | OUTPATIENT
Start: 2022-02-16 | End: 2022-02-16

## 2022-02-16 RX ORDER — DEXMEDETOMIDINE HYDROCHLORIDE 4 UG/ML
0-.7 INJECTION INTRAVENOUS CONTINUOUS
Status: DISCONTINUED | OUTPATIENT
Start: 2022-02-16 | End: 2022-02-16

## 2022-02-16 RX ORDER — HEPARIN SODIUM 1000 [USP'U]/ML
INJECTION, SOLUTION INTRAVENOUS; SUBCUTANEOUS
Status: COMPLETED
Start: 2022-02-16 | End: 2022-02-16

## 2022-02-16 RX ORDER — SODIUM CHLORIDE 9 MG/ML
INJECTION, SOLUTION INTRAVENOUS CONTINUOUS
Status: DISCONTINUED | OUTPATIENT
Start: 2022-02-16 | End: 2022-02-16

## 2022-02-16 RX ORDER — PRASUGREL 10 MG/1
TABLET, FILM COATED ORAL
Status: COMPLETED
Start: 2022-02-16 | End: 2022-02-16

## 2022-02-16 RX ORDER — DEXMEDETOMIDINE HYDROCHLORIDE 4 UG/ML
0-1.5 INJECTION INTRAVENOUS CONTINUOUS
Status: DISCONTINUED | OUTPATIENT
Start: 2022-02-16 | End: 2022-02-17

## 2022-02-16 RX ORDER — CALCIUM GLUCONATE 20 MG/ML
2 INJECTION, SOLUTION INTRAVENOUS ONCE
Status: ACTIVE | OUTPATIENT
Start: 2022-02-16 | End: 2022-02-17

## 2022-02-16 RX ORDER — MIDAZOLAM HYDROCHLORIDE 1 MG/ML
INJECTION INTRAMUSCULAR; INTRAVENOUS
Status: COMPLETED
Start: 2022-02-16 | End: 2022-02-16

## 2022-02-16 RX ORDER — POTASSIUM CHLORIDE 29.8 MG/ML
40 INJECTION INTRAVENOUS ONCE
Status: COMPLETED | OUTPATIENT
Start: 2022-02-16 | End: 2022-02-16

## 2022-02-16 RX ORDER — PRASUGREL 10 MG/1
60 TABLET, FILM COATED ORAL ONCE
Status: DISCONTINUED | OUTPATIENT
Start: 2022-02-16 | End: 2022-02-16

## 2022-02-16 RX ORDER — DIGOXIN 0.25 MG/ML
500 INJECTION INTRAMUSCULAR; INTRAVENOUS ONCE
Status: COMPLETED | OUTPATIENT
Start: 2022-02-16 | End: 2022-02-16

## 2022-02-16 RX ORDER — HEPARIN SODIUM 200 [USP'U]/100ML
INJECTION, SOLUTION INTRAVENOUS
Status: COMPLETED
Start: 2022-02-16 | End: 2022-02-16

## 2022-02-16 RX ORDER — LIDOCAINE HYDROCHLORIDE 20 MG/ML
INJECTION, SOLUTION INFILTRATION; PERINEURAL
Status: COMPLETED
Start: 2022-02-16 | End: 2022-02-16

## 2022-02-16 RX ORDER — CALCIUM CHLORIDE 100 MG/ML
1 INJECTION INTRAVENOUS; INTRAVENTRICULAR ONCE
Status: COMPLETED | OUTPATIENT
Start: 2022-02-16 | End: 2022-02-16

## 2022-02-16 RX ORDER — ACETAMINOPHEN 325 MG/1
650 TABLET ORAL EVERY 6 HOURS PRN
Status: DISCONTINUED | OUTPATIENT
Start: 2022-02-16 | End: 2022-02-17

## 2022-02-16 RX ORDER — ASPIRIN 81 MG/1
81 TABLET, CHEWABLE ORAL DAILY
Status: DISCONTINUED | OUTPATIENT
Start: 2022-02-17 | End: 2022-02-17 | Stop reason: HOSPADM

## 2022-02-16 RX ORDER — FUROSEMIDE 10 MG/ML
20 INJECTION INTRAMUSCULAR; INTRAVENOUS ONCE
Status: ACTIVE | OUTPATIENT
Start: 2022-02-16 | End: 2022-02-17

## 2022-02-16 RX ADMIN — DOBUTAMINE HYDROCHLORIDE 5 MCG/KG/MIN: 100 INJECTION INTRAVENOUS at 18:40

## 2022-02-16 RX ADMIN — DEXMEDETOMIDINE 1.5 MCG/KG/HR: 200 INJECTION, SOLUTION INTRAVENOUS at 15:00

## 2022-02-16 RX ADMIN — DIGOXIN 500 MCG: 250 INJECTION, SOLUTION INTRAMUSCULAR; INTRAVENOUS; PARENTERAL at 19:29

## 2022-02-16 RX ADMIN — HYDROCORTISONE SODIUM SUCCINATE 100 MG: 100 INJECTION, POWDER, FOR SOLUTION INTRAMUSCULAR; INTRAVENOUS at 21:23

## 2022-02-16 RX ADMIN — PRASUGREL 60 MG: 10 TABLET, FILM COATED ORAL at 12:10

## 2022-02-16 RX ADMIN — DEXMEDETOMIDINE 1.5 MCG/KG/HR: 200 INJECTION, SOLUTION INTRAVENOUS at 07:15

## 2022-02-16 RX ADMIN — ACETAMINOPHEN 1000 MG: 500 TABLET ORAL at 05:54

## 2022-02-16 RX ADMIN — Medication 300 MCG/HR: at 23:27

## 2022-02-16 RX ADMIN — HYDROCORTISONE SODIUM SUCCINATE 100 MG: 100 INJECTION, POWDER, FOR SOLUTION INTRAMUSCULAR; INTRAVENOUS at 05:33

## 2022-02-16 RX ADMIN — DOCUSATE SODIUM 50 MG AND SENNOSIDES 8.6 MG 2 TABLET: 8.6; 5 TABLET, FILM COATED ORAL at 18:16

## 2022-02-16 RX ADMIN — HEPARIN SODIUM: 1000 INJECTION, SOLUTION INTRAVENOUS; SUBCUTANEOUS at 11:00

## 2022-02-16 RX ADMIN — FENTANYL CITRATE 100 MCG: 50 INJECTION INTRAMUSCULAR; INTRAVENOUS at 05:07

## 2022-02-16 RX ADMIN — NITROGLYCERIN 10 ML: 20 INJECTION INTRAVENOUS at 10:03

## 2022-02-16 RX ADMIN — VASOPRESSIN 0.03 UNITS/MIN: 20 INJECTION PARENTERAL at 11:33

## 2022-02-16 RX ADMIN — SODIUM CHLORIDE 3 UNITS/HR: 9 INJECTION, SOLUTION INTRAVENOUS at 13:32

## 2022-02-16 RX ADMIN — SODIUM CHLORIDE 3 G: 900 INJECTION INTRAVENOUS at 18:10

## 2022-02-16 RX ADMIN — SODIUM CHLORIDE 3 G: 900 INJECTION INTRAVENOUS at 23:28

## 2022-02-16 RX ADMIN — BUSPIRONE HYDROCHLORIDE 15 MG: 10 TABLET ORAL at 05:54

## 2022-02-16 RX ADMIN — DEXMEDETOMIDINE 1.5 MCG/KG/HR: 200 INJECTION, SOLUTION INTRAVENOUS at 10:57

## 2022-02-16 RX ADMIN — SODIUM CHLORIDE 3 G: 900 INJECTION INTRAVENOUS at 05:54

## 2022-02-16 RX ADMIN — CALCIUM CHLORIDE 1 G: 100 INJECTION, SOLUTION INTRAVENOUS at 05:20

## 2022-02-16 RX ADMIN — AMIODARONE HYDROCHLORIDE 1 MG/MIN: 50 INJECTION, SOLUTION INTRAVENOUS at 23:31

## 2022-02-16 RX ADMIN — FUROSEMIDE 20 MG: 10 INJECTION, SOLUTION INTRAMUSCULAR; INTRAVENOUS at 05:33

## 2022-02-16 RX ADMIN — DOBUTAMINE HYDROCHLORIDE 5 MCG/KG/MIN: 100 INJECTION INTRAVENOUS at 08:14

## 2022-02-16 RX ADMIN — DEXMEDETOMIDINE 1 MCG/KG/HR: 200 INJECTION, SOLUTION INTRAVENOUS at 00:04

## 2022-02-16 RX ADMIN — ATORVASTATIN CALCIUM 80 MG: 80 TABLET, FILM COATED ORAL at 18:16

## 2022-02-16 RX ADMIN — ACETAMINOPHEN 650 MG: 325 TABLET, FILM COATED ORAL at 16:36

## 2022-02-16 RX ADMIN — SODIUM BICARBONATE 50 MEQ: 84 INJECTION, SOLUTION INTRAVENOUS at 05:17

## 2022-02-16 RX ADMIN — Medication 200 MCG/HR: at 13:38

## 2022-02-16 RX ADMIN — LIDOCAINE HYDROCHLORIDE: 20 INJECTION, SOLUTION INFILTRATION; PERINEURAL at 10:03

## 2022-02-16 RX ADMIN — SODIUM CHLORIDE 3 G: 900 INJECTION INTRAVENOUS at 15:04

## 2022-02-16 RX ADMIN — ASPIRIN 324 MG: 81 TABLET, CHEWABLE ORAL at 12:11

## 2022-02-16 RX ADMIN — Medication 200 MCG/HR: at 02:38

## 2022-02-16 RX ADMIN — MIDAZOLAM HYDROCHLORIDE 2 MG: 1 INJECTION, SOLUTION INTRAMUSCULAR; INTRAVENOUS at 16:36

## 2022-02-16 RX ADMIN — FAMOTIDINE 20 MG: 10 INJECTION, SOLUTION INTRAVENOUS at 05:56

## 2022-02-16 RX ADMIN — POTASSIUM CHLORIDE 20 MEQ: 14.9 INJECTION, SOLUTION INTRAVENOUS at 19:30

## 2022-02-16 RX ADMIN — VANCOMYCIN HYDROCHLORIDE 1750 MG: 500 INJECTION, POWDER, LYOPHILIZED, FOR SOLUTION INTRAVENOUS at 05:56

## 2022-02-16 RX ADMIN — HEPARIN SODIUM 16 UNITS/KG/HR: 5000 INJECTION, SOLUTION INTRAVENOUS at 03:44

## 2022-02-16 RX ADMIN — HUMAN ALBUMIN MICROSPHERES AND PERFLUTREN 3 ML: 10; .22 INJECTION, SOLUTION INTRAVENOUS at 09:22

## 2022-02-16 RX ADMIN — DEXMEDETOMIDINE 1.5 MCG/KG/HR: 200 INJECTION, SOLUTION INTRAVENOUS at 13:44

## 2022-02-16 RX ADMIN — POTASSIUM CHLORIDE 40 MEQ: 400 INJECTION, SOLUTION INTRAVENOUS at 07:49

## 2022-02-16 RX ADMIN — HEPARIN SODIUM 352 UNITS/HR: 5000 INJECTION, SOLUTION INTRAVENOUS at 15:48

## 2022-02-16 RX ADMIN — METHYLENE BLUE 150 MG: 5 INJECTION INTRAVENOUS at 08:08

## 2022-02-16 RX ADMIN — PRASUGREL: 10 TABLET, FILM COATED ORAL at 12:15

## 2022-02-16 RX ADMIN — FENTANYL CITRATE 100 MCG: 50 INJECTION INTRAMUSCULAR; INTRAVENOUS at 00:23

## 2022-02-16 RX ADMIN — DEXMEDETOMIDINE 1.5 MCG/KG/HR: 200 INJECTION, SOLUTION INTRAVENOUS at 22:13

## 2022-02-16 RX ADMIN — IOHEXOL 90 ML: 350 INJECTION, SOLUTION INTRAVENOUS at 12:04

## 2022-02-16 RX ADMIN — AMIODARONE HYDROCHLORIDE 150 MG: 1.5 INJECTION, SOLUTION INTRAVENOUS at 19:21

## 2022-02-16 RX ADMIN — DEXMEDETOMIDINE 1.5 MCG/KG/HR: 200 INJECTION, SOLUTION INTRAVENOUS at 18:09

## 2022-02-16 RX ADMIN — HEPARIN SODIUM 2000 UNITS: 200 INJECTION, SOLUTION INTRAVENOUS at 10:20

## 2022-02-16 RX ADMIN — FAMOTIDINE 20 MG: 20 TABLET ORAL at 18:16

## 2022-02-16 RX ADMIN — HEPARIN SODIUM 380 UNITS/HR: 1000 INJECTION, SOLUTION INTRAVENOUS; SUBCUTANEOUS at 19:06

## 2022-02-16 RX ADMIN — EPINEPHRINE 10 MCG/MIN: 1 INJECTION INTRAMUSCULAR; INTRAVENOUS; SUBCUTANEOUS at 17:06

## 2022-02-16 RX ADMIN — HEPARIN SODIUM: 1000 INJECTION, SOLUTION INTRAVENOUS; SUBCUTANEOUS at 10:21

## 2022-02-16 RX ADMIN — VASOPRESSIN 0.03 UNITS/MIN: 20 INJECTION PARENTERAL at 23:26

## 2022-02-16 RX ADMIN — FENTANYL CITRATE 100 MCG: 50 INJECTION INTRAMUSCULAR; INTRAVENOUS at 06:28

## 2022-02-16 RX ADMIN — HYDROCORTISONE SODIUM SUCCINATE 100 MG: 100 INJECTION, POWDER, FOR SOLUTION INTRAMUSCULAR; INTRAVENOUS at 15:26

## 2022-02-16 ASSESSMENT — PAIN DESCRIPTION - PAIN TYPE
TYPE: ACUTE PAIN

## 2022-02-16 NOTE — PROGRESS NOTES
Patient arrived back in room escorted by cath lab staff and RT.   Dakshaella at 89cm, Cowdrey at 65cm.   All drips verified.

## 2022-02-16 NOTE — ASSESSMENT & PLAN NOTE
This is Septic shock Not present on admission  SIRS criteria identified on my evaluation include: Tachycardia, with heart rate greater than 90 BPM, Tachypnea, with respirations greater than 20 per minute, Leukocytosis, with WBC greater than 12,000 and Bandemia, greater than 10% bands  Source is ?aspiration pneumonitis during cardiac arrest  Presentation includes: Severe sepsis present and initial Lactate level result is >= 4 mmol/L. Unable to given fluid boluses due to severe depressed LV function from ischemic heart disease and NSTEMI   Despite appropriate fluid resuscitation with crystalloid given per sepsis guidelines, the patient remains hypotensive with systolic blood pressure less than 90 or MAP less than 65  Hemodynamic support with additional fluids and IV vasopressors as needed to maintain a SBP of 90 or MAP of 65  IV antibiotics as appropriate for source of sepsis  Reassessment: I have reassessed the patient's hemodynamic status    Trach aspirate with MSSA from 2/16, BCs negative  Trending lactate, UOP  Vasopressors for MAP goals > 65  Continue Unasyn  MRSA negative, Vanco stopped  Stop stress dose steroids, cortisol at 22

## 2022-02-16 NOTE — PROGRESS NOTES
Cardiology Follow Up Progress Note    Date of Service  2/16/2022    Attending Physician  Anita Aguilera M.D.    Chief Complaint   Status post arrest, hypotension on pressor support, dilated cardiomyopathy.     HPI  Mina Dimas is a 46 y.o. male admitted 2/13/2022 with above.    Interim Events  He hemodynamically deteriorated last evening requiring more pressors. Stat echocardiogram shows further reduction of left ventricular systolic function.    Review of Systems  Review of Systems   Unable to perform ROS: Intubated       Vital signs in last 24 hours  Pulse:  [] 112  Resp:  [8-32] 22  BP: (61-98)/(42-66) 78/51  SpO2:  [86 %-100 %] 99 %    Physical Exam  Physical Exam  Constitutional:       Interventions: He is sedated and intubated.   HENT:      Head: Normocephalic and atraumatic.   Eyes:      Comments: Closed.   Cardiovascular:      Rate and Rhythm: Normal rate and regular rhythm.   Pulmonary:      Effort: He is intubated.      Breath sounds: Normal breath sounds.   Abdominal:      General: Bowel sounds are normal.      Palpations: Abdomen is soft.   Skin:     General: Skin is warm and dry.         Lab Review  Lab Results   Component Value Date/Time    WBC 13.3 (H) 02/16/2022 03:35 AM    RBC 4.17 (L) 02/16/2022 03:35 AM    HEMOGLOBIN 13.1 (L) 02/16/2022 03:35 AM    HEMATOCRIT 38.5 (L) 02/16/2022 03:35 AM    MCV 92.3 02/16/2022 03:35 AM    MCH 31.4 02/16/2022 03:35 AM    MCHC 34.0 02/16/2022 03:35 AM    MPV 11.3 02/16/2022 03:35 AM      Lab Results   Component Value Date/Time    SODIUM 140 02/16/2022 05:51 AM    POTASSIUM 3.1 (L) 02/16/2022 05:51 AM    CHLORIDE 103 02/16/2022 05:51 AM    CO2 20 02/16/2022 05:51 AM    GLUCOSE 328 (H) 02/16/2022 05:51 AM    BUN 45 (H) 02/16/2022 05:51 AM    CREATININE 1.47 (H) 02/16/2022 05:51 AM      Lab Results   Component Value Date/Time    ASTSGOT 173 (H) 02/16/2022 03:35 AM    ALTSGPT 74 (H) 02/16/2022 03:35 AM     Lab Results   Component Value Date/Time     CHOLSTRLTOT 114 02/15/2022 05:50 AM    LDL 23 02/15/2022 05:50 AM    HDL 40 02/15/2022 05:50 AM    TRIGLYCERIDE 253 (H) 02/15/2022 05:50 AM    TROPONINT 1987 (H) 02/14/2022 05:50 AM       No results for input(s): NTPROBNP in the last 72 hours.    Cardiac Imaging and Procedures Review  CARDIAC STUDIES/PROCEDURES:     ECHOCARDIOGRAM CONCLUSIONS (02/14/22)  Severely reduced left ventricular systolic function.   The left ventricular ejection fraction is visually estimated to be 30%.   Global hypokinesis with regional variation and apical akinesis.  The right ventricle is normal in size and systolic function.  No significant valvular abnormalities.  No prior study is available for comparison. Cardiology team following   the patient, aware of results.     EKG performed on (02/13/21) EKG shows sinus rhythm with non-specific intra-ventricular conduction delay.     Assessment/Plan  1. Out of hospital arrest with dilated cardiomyopathy, cardiogenic shock: He clinically deteriorated last evening requiring more pressor support and his echocardiogram demonstrates further decline of his left ventricular systolic function. We will schedule for emergent Impella placement.    Thank you for allowing me to participate in the care of this patient.  I will continue to follow this patient    Please contact me with any questions.    Chapin May M.D.   Cardiologist, [facility]  [phone]

## 2022-02-16 NOTE — PROGRESS NOTES
Informed Dr. Mclean about patient's CVP waveform showing it's in the ventricles but [place] line is wedging, continue with POC

## 2022-02-16 NOTE — CARE PLAN
The patient is Unstable - High likelihood or risk of patient condition declining or worsening    Shift Goals  Clinical Goals: hemodynamic stability, wean pressors  Patient Goals: EULALIO  Family Goals: EULALIO    Progress made toward(s) clinical / shift goals:     Problem: Psychosocial  Goal: Patient's level of anxiety will decrease  Outcome: Progressing  Patient is progressing but still showing episodes of severe agitation and restlessness.     Problem: Mechanical Ventilation  Goal: Safe management of artificial airway and ventilation  Outcome: Progressing  Patient had multiple episodes of agitation and restlessness, which showed signs he could injure himself. At times this patient was able to calm when reoriented and appeared to relax without the use of prn medication.     Goal: Patient will be able to express needs and understand communication  Outcome: Progressing   Patient is following simple one step commands but cannot communicate at this time.     Problem: Safety - Medical Restraint  Goal: Remains free of injury from restraints (Restraint for Interference with Medical Device)  Outcome: Progressing  Patient has remained free from injury while in restraints.     Goal: Free from restraint(s) (Restraint for Interference with Medical Device)  Outcome: Progressing   Patient is still requiring restraints at this time. He is able to follow simple commands but is not alert enough to remain safe while intubated.     Problem: Skin Integrity  Goal: Skin integrity is maintained or improved  Outcome: Progressing         Patient is not progressing towards the following goals:    Problem: Hemodynamics  Goal: Patient's hemodynamics, fluid balance and neurologic status will be stable or improve  Outcome: Not Progressing   Patient is requiring multiple vasopressor support.  His fluid status is fluctuating but progressing.

## 2022-02-16 NOTE — PROGRESS NOTES
Pulmonary Critical Care Progress Note      Continued care of patient with cardiac arrest severe shock apparently mixed cardiogenic and likely septic  Unfortunately due to downtime lab results were available after significant amount of time, they were notable for: Creatinine worsened to 1.52, lactate of 4.8 (prior was 5), pH 7.26/36.9/16.8, calcium 7.4 and pro-Garry significantly elevated.     Notably chest x-ray also with left-sided> right-sided infiltrates however minimal secretions     Repeat [place] numbers 4 hours later  Cardiac index decreased to 2.6 mean PAP increased to 40, CVP increased to 17, wedge pressure increased to 25, SVR I decreased to 1401.  Heart rate remained in the 120s.     Notably when sedation was stopped patient was able to wake up follow commands well showed understanding and answer questions appropriately by blinking eyes.     Bedside ultrasound performed showing minimal left ventricular contraction, good right heart function, and very enlarged IVC with no respiratory variation.     With these findings and worsening [place] numbers, decision to increase dobutamine to 7.5 for light diuresis with 20 of Lasix will evaluate with response.  If worsening (as may be possible as patient likely combined shock will decrease dobutamine, however very low cardiac output and lactate remaining elevated)    With white count, severe shock , and significantly elevated pro-Garry- panculture including MRSA nares swab sent, Vanco added to Unasyn previously ordered, as with low SVR concern for some contribution of septic shock.  Added stress dose steroids, cortisol level sent,     On max nor epi epi and vaso, with maps decreasing into the 50s.  Avoiding medial as significant heart failure and alpha only agonism likely to worsen cardiac status.       -Sedation changed from propofol to Precedex, in hopes to decrease negative cardiac effects of propofol, fentanyl pushes and drip as needed  -Lasix 20  -Dobutamine  increased  -Panculture sent.   -Added vancomycin, and MRSA nares swab  -Stress dose steroids added  -Cortisol level sent  -Calcium ordered  -Stopping cooling protocol as neurologically intact    After Lasix patient had good urine output with around 500 cc in last 4 hours, heart rate had decreased to 100-110, and pressor requirement had decreased from levo 50-> levo 25, with warm extremities.        At around 5:30 AM-suctioning and lavage was being done to obtain sputum sample, at this time patient's oxygenation and blood pressure simultaneously decreased significantly, patient almost coded however he was bagged oxygen came back up and patient never lost pulses.  Patient was back up to max nor epi, epi and vaso with maps in the mid 50s.     Bicarb push, and calcium were given (as prior calcium had not been run at that time), hydrocortisone was being given.     Reviewed [place] numbers, interestingly since dobutamine had increased cardiac index had decreased, with increasing SVR.  Patient's heart rate was in the 130s with ectopy.     -Sputum that was collected was bloody, ?  DAH with 1 lung  with significant infiltrate in the other more less clear.   -Held heparin  -Sent coags lactate repeat electrolytes  -Decrease dobutamine back to 5-due to ectopy and tachycardia, and the fact it did not seem to improve status, especially with concern of sepsis.   -Repeat ABG half hour after back on vent  -Patient may need bronchoscopy in the future for evaluation of bloody secretions, however with pt significant decompensation with just lavage will defer at this time    All events was discussed with daytime attending, including labs to follow-up.  After these interventions patient's map was back to 60-65 still on max pressors, heart rate was back down to 1 10-120s, and patient was oxygenating well.     Total critical care time: 70 minutes, including bedside ultrasound, multiple evaluation of PA catheter numbers, acute resuscitation,  review of chart, exam, and discussion of care with nursing

## 2022-02-16 NOTE — DIETARY
Nutrition Services: Day 3 of admit.  Mina Dimas is a 46 y.o. male with admitting DX of Cardiac arrest (HCC)  Consult received for cardiac rehab diet education. S/p cardiac cath with placement of coronary stent and Impella device today. RD will provide diet education when appropriate.

## 2022-02-16 NOTE — PROGRESS NOTES
Dr. Castillo at bedside. Patient is awake and following commands. Orders received to stop normothermia protocol. Pads will remain in place to ensure patient no longer needs this intervention.

## 2022-02-16 NOTE — PROGRESS NOTES
Overnight critical care progress note    Called to bedside as patient with worsening tachycardia, increasing pressor requirements on almost max nor epi, epi and vaso-    Most recent [place] numbers significant for CI 2.73 (slight decrease) PAP mean  32(increasing) wedge of 13 (stable) CVP 11 (slight increase) SVRI 1578 (slight decrease), with minimal urine output.  Heart rate 110s to 120.     Ordered ABG, repeat electrolytes, pro-Garry, lactate.     Total critical care time 45 minutes including chart review, evaluation of PA catheter numbers, adjusting pressors, and ventilator adjustment discussion of plan of care with nursing     In addition to Dr. Aguilera's and my critical care time from night prior

## 2022-02-16 NOTE — CARE PLAN
Ventilator Daily Summary    Vent Day #3    Ventilator settings changed this shift:CMV 28 420 +10 60%    Weaning trials:no    Respiratory Procedures:no    Plan: Continue current ventilator settings and wean mechanical ventilation as tolerated per physician orders.     Problem: Ventilation  Goal: Ability to achieve and maintain unassisted ventilation or tolerate decreased levels of ventilator support  Description: Target End Date:  4 days     Document on Vent flowsheet    1.  Support and monitor invasive and noninvasive mechanical ventilation  2.  Monitor ventilator weaning response  3.  Perform ventilator associated pneumonia prevention interventions  4.  Manage ventilation therapy by monitoring diagnostic test results  Outcome: Progressing

## 2022-02-16 NOTE — PROGRESS NOTES
Critical Care Progress Note    Date of admission  2/13/2022    Chief Complaint  46 y.o. male admitted 2/13/2022 with out of hospital cardiac arrest    Hospital Course  Mr. Dimas is a 46 year old male with the past medical history of CAD and diabetes [place]er CPR and defibrillated out of VF/VT.  The patient was intubated and brought to [facility].  Cardiology is following and started on heparin drip and aspirin.  2/14 - VD#2, ?seizure, troponin to 1987, low UOP  2/15 - VD#3, worsening shock-->a-line and [place] placed, Levo, vaso, and dobutamine.  Unasyn/vanco started    Interval Problem Update  Reviewed last 24 hour events:   - night time events reviewed with nocturnal Intensivist during morning handoff at 0600   - required Levo at 50, epi at 10, and vaso at 0.03   - did not tolerate dobutamine to 7.5 and now back at 5   - Lasix 20mg IV x 2 with 800cc UOP overnight   - refractory shock-->150mg of methylene blue given   - stat ECHO with worsening EF-->pt in need of emergent Impella placement   - propofol stopped and Precedex started   - SAT-->following commands   - SR/ST 90-110s   - SBP 70-100s   - Tmax 98.8   - BMS in place-->minimal output   - UOP of 800cc with rich after lasix    - TFs started   - heparin drip held due to ? Hemoptysis   - Vent day #4   - CXR(reviewed): bilateral opacities seen again L>R, air bronchograms present   - FiO2 at 80% and PEEP of 12-->10   - Heparin drip held    - ASA/high intensity statin   - pepcid   - -320s-->insulin drip to start   - K 3.1   - Creat 1.32-->1.52-->1.47   - iCa 1.1   - lactate 4.7   - steroids   - day 2 of Unasyn/vanco    **0800:  Methylene blue 150mg IV given with improvement to vasopressor needs with reduction in levophed down to 30 and epi down to 5 over the next 60 minutes.  Stat repeat ECHO with worsening EF to 20%.  Discussion with cardiology as the bedside about the patient's shock status:  Likely distributive and  cardiogenic.  Emergent Impella to be placed by cardiology.  Patient also undergoing emergent cardiac catheterization and will await updates from cardiology.    Yesterday's Events:   - no events overnight   - wakes and following with SAT   - Prop of 5-10   - fent at 100   - RASS of +3   - -120s   - SBP 80-90s   - Levo at 12   - Afebrile   - right nare core track   - BMS in place with no output   - Root with 120cc overnight UOP   - heparin drip   - ASV at 140/8/100%   - CXR( reviewed): worsening bilateral opacitites   - heparin gtt, asa, stain   - pepcid   - -150s   - lytes ok, K 4.4   - creat 1.32    Review of Systems  Review of Systems   Unable to perform ROS: Intubated        Vital Signs for last 24 hours   Pulse:  [] 122  Resp:  [8-32] 28  BP: (61-98)/(42-66) 92/62  SpO2:  [92 %-100 %] 100 %    Hemodynamic parameters for last 24 hours  CVP:  [8 MM HG-11 MM HG] 11 MM HG  PCWP:  [15 MM HG-26 MM HG] 25 MM HG  CO:  [4.13-4.46] 4.18  CI:  [2.22-2.39] 2.24    Respiratory Information for the last 24 hours  Vent Mode: APVCMV  Rate (breaths/min): 28  Vt Target (mL): 420  PEEP/CPAP: 12  MAP: 15  Control VTE (exp VT): 187    Physical Exam   Physical Exam  Vitals and nursing note reviewed.   Constitutional:       Appearance: He is obese. He is ill-appearing.   HENT:      Head: Normocephalic and atraumatic.      Right Ear: External ear normal.      Left Ear: External ear normal.      Nose: Nose normal. No rhinorrhea.      Mouth/Throat:      Mouth: Mucous membranes are moist.      Comments: ETT in place  Eyes:      General: No scleral icterus.     Conjunctiva/sclera: Conjunctivae normal.      Pupils: Pupils are equal, round, and reactive to light.      Comments: Pupils 2mm and reactive   Neck:      Comments: Right IJ TLC, right IJ [place]-Kamla catheter   Cardiovascular:      Rate and Rhythm: Regular rhythm. Tachycardia present.      Pulses: Normal pulses.      Heart sounds: Normal heart sounds. No murmur  heard.  Pulmonary:      Breath sounds: No wheezing.      Comments: Breathing comfortably on the vent, somewhat diminished on left vs right, no crackles  Chest:      Chest wall: No tenderness.   Abdominal:      General: There is no distension.      Palpations: Abdomen is soft.      Tenderness: There is no abdominal tenderness. There is no guarding or rebound.   Genitourinary:     Comments: Root in place  Musculoskeletal:         General: Normal range of motion.      Cervical back: Normal range of motion and neck supple.      Right lower leg: No edema.      Left lower leg: No edema.   Lymphadenopathy:      Cervical: No cervical adenopathy.   Skin:     General: Skin is warm and dry.      Capillary Refill: Capillary refill takes 2 to 3 seconds.      Findings: No rash.   Neurological:      Sensory: No sensory deficit.      Motor: No weakness.      Comments: Tracking and following commands, moving all x 4   Psychiatric:      Comments: Unable to assess         Medications  Current Facility-Administered Medications   Medication Dose Route Frequency Provider Last Rate Last Admin   • furosemide (LASIX) injection 20 mg  20 mg Intravenous Once Anabel Castillo M.D.       • MD Alert...Vancomycin per Pharmacy   Other PHARMACY TO DOSE Anabel Castillo M.D.       • vancomycin (VANCOCIN) 1,750 mg in  mL IVPB  25 mg/kg Intravenous Once Anabel Castillo M.D. 166.7 mL/hr at 02/16/22 0556 1,750 mg at 02/16/22 0556   • calcium GLUConate 2 g in NaCl IVPB premix  2 g Intravenous Once Anabel Castillo M.D.       • hydrocortisone sodium succinate PF (Solu-CORTEF) 100 MG injection 100 mg  100 mg Intravenous Q8HRS Anabel Castillo M.D.   100 mg at 02/16/22 0533   • furosemide (LASIX) injection 20 mg  20 mg Intravenous Q DAY Anita Aguilera M.D.   20 mg at 02/16/22 0533   • Pharmacy Consult: Enteral tube insertion - review meds/change route/product selection  1 Each Other PHARMACY TO DOSE Anita Aguilera M.D.       • vasopressin  (VASOSTRICT) 20 Units in  mL Infusion  0.03 Units/min Intravenous Continuous Anita Aguilera M.D. 9 mL/hr at 02/15/22 2318 0.03 Units/min at 02/15/22 2318   • DOBUTamine (DOBUTREX) 1 mg/mL premix infusion  5 mcg/kg/min Intravenous Continuous Anita Aguilera M.D. 22.3 mL/hr at 02/15/22 2334 5 mcg/kg/min at 02/15/22 2334   • norepinephrine (Levophed) infusion 32 mg/500 mL (premix)  0-50 mcg/min Intravenous Continuous Anita Aguilera M.D. 28.1 mL/hr at 02/16/22 0403 30 mcg/min at 02/16/22 0403   • EPINEPHrine (Adrenalin) infusion 4 mg/250 mL (premix)  0-10 mcg/min Intravenous Continuous Anita Aguilera M.D. 37.5 mL/hr at 02/16/22 0116 10 mcg/min at 02/16/22 0116   • ampicillin/sulbactam (UNASYN) 3 g in  mL IVPB  3 g Intravenous Q6HRS Anita Aguilera M.D.   Stopped at 02/16/22 0624   • dexmedetomidine (Precedex) 400 mcg/100mL D5W premix infusion  0.1-1.5 mcg/kg/hr Intravenous Continuous Anabel Castillo M.D. 18.6 mL/hr at 02/16/22 0004 1 mcg/kg/hr at 02/16/22 0004   • Respiratory Therapy Consult   Nebulization Continuous RT Juan Pablo Garcia M.D.       • famotidine (PEPCID) tablet 20 mg  20 mg Enteral Tube Q12HRS Juan Pablo Garcia M.D.   20 mg at 02/15/22 1736    Or   • famotidine (PEPCID) injection 20 mg  20 mg Intravenous Q12HRS Juan Pablo Garcia M.D.   20 mg at 02/16/22 0556   • senna-docusate (PERICOLACE or SENOKOT S) 8.6-50 MG per tablet 2 Tablet  2 Tablet Enteral Tube BID Juan Pablo Garcia M.D.   2 Tablet at 02/15/22 1736    And   • polyethylene glycol/lytes (MIRALAX) PACKET 1 Packet  1 Packet Enteral Tube QDAY PRN Juan Pablo Garcia M.D.        And   • magnesium hydroxide (MILK OF MAGNESIA) suspension 30 mL  30 mL Enteral Tube QDAY PRN Juan Pablo Garcia M.D.        And   • bisacodyl (DULCOLAX) suppository 10 mg  10 mg Rectal QDAY PRN Juan Pablo Garcia M.D.       • MD Alert...ICU Electrolyte Replacement per Pharmacy   Other PHARMACY TO DOSE Juan Pablo Garcia M.D.       • lidocaine (XYLOCAINE)  1 % injection 2 mL  2 mL Tracheal Tube Q30 MIN PRN Juan Pablo Garcia M.D.       • fentaNYL (SUBLIMAZE) injection 50 mcg  50 mcg Intravenous Q15 MIN PRN Juan Pablo Garcia M.D.   50 mcg at 02/15/22 0933    And   • fentaNYL (SUBLIMAZE) injection 100 mcg  100 mcg Intravenous Q15 MIN PRN Juan Pablo Garcia M.D.   100 mcg at 02/16/22 0628    And   • fentaNYL (SUBLIMAZE) 50 mcg/mL in 50mL (Continuous Infusion)   Intravenous Continuous Juan Pablo Garcia M.D. 4 mL/hr at 02/16/22 0238 200 mcg/hr at 02/16/22 0238   • ipratropium-albuterol (DUONEB) nebulizer solution  3 mL Nebulization Q2HRS PRN (RT) Juan Pablo Garcia M.D.       • atorvastatin (LIPITOR) tablet 80 mg  80 mg Enteral Tube Q EVENING Christopher Keller M.D.   80 mg at 02/15/22 1736   • Pharmacy Consult: pharmacy to discontinue all other orders for acetaminophen   Other PHARMACY TO DOSE Christopher Keller M.D.       • meperidine (DEMEROL) injection 25 mg  25 mg Intravenous Q HOUR PRN Christopher Keller M.D.   25 mg at 02/15/22 1930   • acetaminophen (TYLENOL) tablet 1,000 mg  1,000 mg Enteral Tube Q6HRS Anita Aguilera M.D.   1,000 mg at 02/16/22 0554   • busPIRone (BUSPAR) tablet 15 mg  15 mg Enteral Tube BID Anita Aguilera M.D.   15 mg at 02/16/22 0554   • aspirin (ASA) chewable tab 81 mg  81 mg Enteral Tube DAILY Anita Aguilera M.D.   81 mg at 02/15/22 0558   • LORazepam (ATIVAN) injection 2-4 mg  2-4 mg Intravenous Q2 MIN PRN Anita Aguilera M.D.   2 mg at 02/14/22 0844   • insulin regular (HumuLIN R,NovoLIN R) injection  3-14 Units Subcutaneous Q6HRS Anita Aguilera M.D.   7 Units at 02/16/22 0341    And   • dextrose 50% (D50W) injection 50 mL  50 mL Intravenous Q15 MIN PRN Anita Aguilera M.D.       • heparin infusion 25,000 units in 500 mL 0.45% NACL  0-30 Units/kg/hr Intravenous Continuous Rupesh Canseco M.D. 24.7 mL/hr at 02/16/22 0344 16 Units/kg/hr at 02/16/22 0344   • heparin injection 2,000 Units  2,000 Units Intravenous PRN Rupesh Canseco M.D.   2,000 Units  at 02/15/22 1906       Fluids    Intake/Output Summary (Last 24 hours) at 2/16/2022 0645  Last data filed at 2/15/2022 1906  Gross per 24 hour   Intake 1295.37 ml   Output 155 ml   Net 1140.37 ml       Laboratory  Recent Labs     02/15/22  2227 02/16/22  0417 02/16/22  0554   ISTATAPH 7.265* 7.350* 7.285*   ISTATAPCO2 36.9 40.8* 50.1*   ISTATAPO2 56* 132* 128*   ISTATATCO2 18* 24 25   BPOVNOM7MWJ 85* 99 98   ISTATARTHCO3 16.8* 22.5 23.8   ISTATARTBE -9* -3 -3   ISTATTEMP 37.1 C 36.8 C 37.4 C   ISTATFIO2 60 100 100   ISTATSPEC Arterial Arterial Arterial   ISTATAPHTC 7.264* 7.353* 7.280*   [id number] 57* 130* 131*         Recent Labs     02/14/22  0315 02/15/22  0550 02/15/22  2220 02/16/22  0335   SODIUM  --  139 139 138   POTASSIUM  --  4.4 4.0 3.2*   CHLORIDE  --  105 104 103   CO2  --  18* 20 18*   BUN  --  33* 44* 42*   CREATININE  --  1.32 1.52* 1.40   MAGNESIUM 1.8 2.2 2.2 2.2   PHOSPHORUS 3.3 4.8*  --  3.5   CALCIUM  --  8.2* 7.4* 7.0*     Recent Labs     02/14/22 0150 02/14/22 0550 02/15/22  0550 02/15/22  2220 02/16/22  0335   ALTSGPT 143*  --  95*  --  74*   ASTSGOT 190*  --  136*  --  173*   ALKPHOSPHAT 74  --  72  --  46   TBILIRUBIN 0.5  --  0.7  --  0.6   PREALBUMIN  --  8.4*  --   --   --    GLUCOSE 233*  --  160* 232* 273*     Recent Labs     02/13/22  2140 02/14/22  0150 02/15/22  0550 02/15/22  2220 02/16/22  0335   WBC 17.2*  --  9.3 16.8* 13.3*   NEUTSPOLYS 67.00  --   --  61.80  --    LYMPHOCYTES 20.00*  --   --  7.80*  --    MONOCYTES 10.40  --   --  7.80  --    EOSINOPHILS 0.90  --   --  0.90  --    BASOPHILS 0.00  --   --  0.00  --    ASTSGOT 207* 190* 136*  --  173*   ALTSGPT 133* 143* 95*  --  74*   ALKPHOSPHAT 89 74 72  --  46   TBILIRUBIN 0.5 0.5 0.7  --  0.6     Recent Labs     02/14/22  0150 02/15/22  0550 02/15/22  2220 02/16/22  0335 02/16/22  0551   RBC  --  5.32 4.48* 4.17*  --    HEMOGLOBIN  --  17.3 14.5 13.1*  --    HEMATOCRIT  --  51.4 42.1 38.5*  --    PLATELETCT  --  210 183  142*  --    PROTHROMBTM 14.8*  --   --   --  19.6*   APTT 137.2*  --   --   --  90.0*   INR 1.20*  --   --   --  1.71*       Imaging  ECHO 2/16:  Severely reduced left ventricular systolic function. The left   ventricular ejection fraction is visually estimated to be 20%.  Global hypokinesis with apical akinesis. No apical thrombus seen.  No pericardial effusion.    ECHO 2/14:  Severely reduced left ventricular systolic function. The left   ventricular ejection fraction is visually estimated to be 30%. Global   hypokinesis with regional variation and apical akinesis  The right ventricle is normal in size and systolic function.  No significant valvular abnormalities.  No prior study is available for comparison. Cardiology team following   the patient, aware of results.    Assessment/Plan  * Cardiac arrest (HCC)  Assessment & Plan  Out of hospital cardiac arrest due to NSTEMI  Cont supportive therapy  Monitor for arrythmia  Mg goal > 2, K goal > 4  Cardiology following  S/p normothermia protocols    TAYLOR (acute kidney injury) ([facility])  Assessment & Plan  Suspect due to shock state and hypoperfusion  Cont to monitor UOP/creat  Avoid nephrotoxins  Lasix as needed    Cardiogenic shock ([facility])- (present on admission)  Assessment & Plan  Present after cardiac arrest and due to NSTEMI  [place] placed on 2/15  Cont dobutamine at 5  Cont levophed, vasopressin, and epinephrine for MAP goals > 65  Cardiology following-->emergent Impella and cardiac catheterization  Monitor UOP/lactates    Septic shock ([facility])  Assessment & Plan  This is Septic shock Not present on admission  SIRS criteria identified on my evaluation include: Tachycardia, with heart rate greater than 90 BPM, Tachypnea, with respirations greater than 20 per minute, Leukocytosis, with WBC greater than 12,000 and Bandemia, greater than 10% bands  Source is ?aspiration pneumonitis during cardiac arrest  Presentation includes: Severe sepsis present and initial Lactate  level result is >= 4 mmol/L. Unable to given fluid boluses due to severe depressed LV function from ischemic heart disease and NSTEMI   Despite appropriate fluid resuscitation with crystalloid given per sepsis guidelines, the patient remains hypotensive with systolic blood pressure less than 90 or MAP less than 65  Hemodynamic support with additional fluids and IV vasopressors as needed to maintain a SBP of 90 or MAP of 65  IV antibiotics as appropriate for source of sepsis  Reassessment: I have reassessed the patient's hemodynamic status    Pan cultured  Trending lactate, UOP  Vasopressors for MAP goals > 65  Unasyn/Vanco  MRSA pending  Stress dose steroids, cortisol pending      Transaminitis- (present on admission)  Assessment & Plan  Continue to improve  Likely related to shock liver from cardiac arrest    Hypomagnesemia- (present on admission)  Assessment & Plan  Replete to goal > 2    Acute encephalopathy- (present on admission)  Assessment & Plan  ? Downtime and ?hypoxic injury-->improved, following commands  No further seizure activity  EEG with metabolic encephalopathy    NSTEMI (non-ST elevated myocardial infarction) ([facility])- (present on admission)  Assessment & Plan  CAD hx  Heparin drip ongoing  Cont ASA, high intensity statin  Cardiology following-->emergent cardiac catheterization now    Shock ([facility])- (present on admission)  Assessment & Plan  Concern for both distributive (septic) and cardiogenic shock  [place]-Kamla catheter placed and noted both low CI and depressed SVR  Cont levophed, epinephrine, and vasopressin for MAP goals > 65  Dobutamine at 5  S/p methylene blue for refractory shock on 2/16  Pan cultured-->cont Unasyn/Vanco  Stress dose steroids added, cortisol pending  Follow lactates, UOP      Lactic acidosis  Assessment & Plan  Due to cardiac arrest  Worsening due to shock from sepsis and decompensated cardiogenic shock    Type 2 diabetes mellitus (HCC)  Assessment & Plan  BG goals  140-180s-->uncontrolled while in shock and on steroids  Start insulin drip  Hourly BG    Acute respiratory failure with hypoxia (HCC)  Assessment & Plan  Intubated on 2/13  Cont full vent support  RT/O2 protocols  Vent bundle protocols  I am actively adjusting vent settings based on ABGs  Lung protective ventilation strategies  SATs daily, SBTs when clinically appropriate  Lasix as needed       VTE:  Heparin  Ulcer: H2 Antagonist  Lines: Central Line  Ongoing indication addressed and Root Catheter  Ongoing indication addressed    I have performed a physical exam and reviewed and updated ROS and Plan today (2/16/2022). In review of yesterday's note (2/15/2022), there are no changes except as documented above.     Discussed patient condition and risk of morbidity and/or mortality with RN, RT, Pharmacy, Charge nurse / hot rounds, Patient and cardiology    The patient remains critically ill.  I have assessed and reassessed the respiratory status and made ventilator adjustments based upon arterial blood gas analysis, ventilator waveforms and airway mechanics.  I have assessed and reassessed the blood pressure, hemodynamics, cardiovascular status. This patient remains at high risk for worsening cardiopulmonary dysfunction and death without the above critical care interventions.     Additional critical care time to Dr. Castillo from earlier today= 110 minutes in directly providing and coordinating critical care and extensive data review.  No time overlap and excludes procedures.

## 2022-02-16 NOTE — PROGRESS NOTES
Assumed care of patient from NOC RN. VSS. All drips verified. Call light in reach, bed in lowest position

## 2022-02-16 NOTE — PROCEDURES
CARDIAC CATHETERIZATION REPORT    Referring Provider: Chapin May M.D.    PROCEDURE PHYSICIAN: Arturo Khan MD, [facility], [facility]  ASSISTANT: None    IMPRESSIONS:  1. NSTEMI with cardiogenic shock due to severe LM and three vessel CAD  2. Successful PCI of the left main bifurcation into LAD and circumflex using DK Crush technique, IVUS guidance, 2 DARYL  3. Cardiogenic shock, improved with Impella CP placement  4. Residual occlusion of the RCA and moderate disease in the distal circumflex.   5. Incidental note of severe PAD involving the right external iliac artery    Recommendations:  Dual antiplatelet therapy for 12 months  maintain impella CP. Consider staged revascularization of the RCA and relook at the dsital circumflex lesion    Pre-procedure diagnosis: NSTEMI, Cardiogenic shock  Post-procedure diagnosis: Same    Procedure performed  Selective coronary angiography  Left heart catheterization  Percutaneous coronary intervention - Stent Placement (Left main, LAD, circumflex)  Intravascular Ultrasound (Left main, LAD, circumflex)  Insertion of Impella CP device  Vascular access closure device (Perclose in the right common femoral artery)  Defibrillation    Conscious sedation was supervised by myself and administered by trained personnel using fentanyl and versed between 0944 and 1204. The patient tolerated sedation without complication.     Procedure Description  1. Access: 6 and 14 [place] right and left femoral vein Micropuncture technique was utilized following local anesthesia with lidocaine.  Fluoroscopic guidance was utilized for femoral access Dynamic ultrasound was utilized to gain access    2. Diagnostic description: After obtaining access, I performed abdominal aorta/bifemoral angiography to determine the appropriate access site.  A left was selected for the Impella due to severe stenosis in the external iliac artery on the right.  He Impella sheath was then placed in the left groin and left heart  "catheterization performed with pigtail catheter confirming the diagnosis of cardiogenic shock.  I then placed the Impella CP device in the LV apex.  During placement the patient went into ventricular fibrillation and required defibrillation x1 as well as an unsuccessful precordial thump.  I then performed angiography with JL and JR4 catheters.  The films as well as clinical scenario were reviewed with the referring cardiologist as well as the on-call cardiothoracic surgeon and the team felt the best option was to proceed with percutaneous coronary prevention of the left system, with medical therapy of the right coronary.    3. Description of Intervention: After confirming therapeutic anticoagulation intervention proceeded. A 6F EBU 3.5 guiding catheter was used. The lesion in the LAD was crossed with a 0.014\" BMW wire.  The circumflex was crossed with a Prowater wire.  I performed balloon angioplasty of the left main and circumflex with a 3.0 x 15 mm compliant balloon.  I then performed IVUS of the left circumflex, left main as well as LAD.  The LAD was then dilated with a 3.0 x 15 compliant balloon.  I then deployed a 4.0 x 26 mm [place] drug-eluting stent from the left main, into the circumflex at nominal atmospheres.  There was marked to stepdown after the stent and I was unable to distinguish edge dissections I placed an additional 3.5 x 12 mm Cody stent at the distal edge deployed at nominal atmospheres.  The overlap was dilated the stent balloon at 18 ofelia.  I then placed 4.0 x 12 mm NC balloon in the LAD and left main and after removing my circumflex wire crushed the circumflex stent.  The circumflex was then rewired with a Prowater wire and sequential dilation carried out with a 2.0, 3.0 and lastly 4.0 compliant balloons, with a final kissing balloon angioplasty utilizing a 3.5 NC balloon in the LAD.  I then deployed a 3.0 x 30 mm Cody drug-eluting stent at 18 ofelia from the left main into the LAD, overlapping the " prior stent.  This was postdilated with a 3.5 x 12 NC balloon in the midportion and a 4.0 x 12 mm NC balloon in the left main portion as well as the LAD.  I then again rewired the circumflex and again perform sequential dilation with a 1.5 and 2.0 then 4.0 NC balloon, finishing with a kissing balloon angioplasty of the bifurcation with 4.0 x 15 NC balloons in the circumflex and 3.5 x 15 NC in the LAD.  I then performed IVUS of the left main and LAD which showed excellent result.  Subsequent angiography confirmed the findings.    4. Closing: At completion of the procedure the relevant equipment was removed from the body and hemostasis achieved by Perclose for the right common femoral artery access.  The Impella sheath was secured in place.    Findings   Hemodynamics:   Aorta: 63/37 mmHg  LVEDP: 41 mmHg    Coronary Anatomy   Left Main: Distal 90% stenosis involving the bifurcation and a Hay 1, 1, 1 configuration   LAD: The ostium has 40% stenosis, just distal to this there is a 70% stenosis extending into the previously placed stent.  Distally there is 40% stenosis.  The first diagonal has a 50% ostial stenosis and 80% stenosis in the upper branch   LCx: 50% ostial stenosis, 99% proximal stenosis, 50-60% distal.  There are 2 significant diagonals with minimal luminal irregularities   RCA: Dominant, Subtotal mid occlusion with JAMEY I flow distally.     Results of intervention to the left main:  Pre: 90% stenosis and JAMEY III flow  Post: 0% residual stenosis and JAMEY III flow. No dissection or distal embolization.    Results of intervention to the LAD:  Pre: 70% stenosis and JAMEY III flow  Post: 0% residual stenosis and JAMEY III flow. No dissection or distal embolization.    Results of intervention to the circumflex:  Pre: 99% stenosis and JAMEY III flow  Post: 0% residual stenosis and JAMEY III flow. No dissection or distal embolization.    Technical Factors  1. Complications: None  2. Estimated Blood Loss: <50 cc  3.  Specimens: None  4. Contrast Volume: 100 ml  5. Medications: Heparin to maintain ACT >250 Prasugrel 60 mg Intracoronary nitroglycerin  6. Radiation (Air Kerma): 926 mGy

## 2022-02-16 NOTE — PROGRESS NOTES
Informed Dr. Aguilera about patient's negative cough and gag even when sedation is turned off, 120 ml urine output, and arctic sun's big temperature change overnight (>10 degrees celsius), continue with POC.

## 2022-02-16 NOTE — PROGRESS NOTES
Dr. Aguilera notified of cardiac power output. Advised to reach out to cards. Shellie Avina reached out

## 2022-02-16 NOTE — CARE PLAN
Ventilator Daily Summary    Vent Day #4    Ventilator settings changed this shift:+10 70 %    Weaning trials:none    Respiratory Procedures:none    Plan: Continue current ventilator settings and wean mechanical ventilation as tolerated per physician orders.     Problem: Ventilation  Goal: Ability to achieve and maintain unassisted ventilation or tolerate decreased levels of ventilator support  Description: Target End Date:  4 days     Document on Vent flowsheet    1.  Support and monitor invasive and noninvasive mechanical ventilation  2.  Monitor ventilator weaning response  3.  Perform ventilator associated pneumonia prevention interventions  4.  Manage ventilation therapy by monitoring diagnostic test results  Outcome: Progressing

## 2022-02-16 NOTE — ASSESSMENT & PLAN NOTE
Present after cardiac arrest and due to NSTEMI and ongoing ischemia  Florence-Kamla placed on 2/15  Cont dobutamine at 5  Cont levophed and epinephrine for MAP goals > 65  Cardiology following  Emergent Impella placed on 2/16-->pt with EF<10% and cardiac power output at 0.6 juárez.  Due to such poor LV function patient will need emergent transfer to higher level of care where there is ECMO/transplant capabilities.  Referrals made and family prefers [facility] (Dr. Ronak Marquez accepting).  Patient is absolutely too high risk to be flown with multiple vasopressors, antiarrhythmics, ionotropes, APRV vent setting, and Impella.  Discussed case with cardiology, ECMO prn, and CTS-->will cannulate here and emergently transfer on ECMO to [facility].  Monitor UOP/lactates

## 2022-02-16 NOTE — ASSESSMENT & PLAN NOTE
Suspect due to shock state and hypoperfusion  Improved with Impella  Cont to monitor UOP/creat  Avoid nephrotoxins  Lasix as needed

## 2022-02-16 NOTE — PROCEDURES
Procedure Performed: Impella CP respositioning    Procedure physician: Bill  Assistant: None    Pre-procedure diagnosis/Indication: Cardiogenic shock, impella loss of CO, alarms  Post-procedure diagnosis: Same    Procedure description: Under dynamic echocardiographic guidance the Impella was identified as being at the level of the aortic valve.  The catheter was advanced to 92 cm which restored optimal Impella function.    Specimens: None  EBL: None  Complications: None    Impression  1.  Successful ultrasound-guided Impella repositioning.

## 2022-02-17 ENCOUNTER — HOSPITAL ENCOUNTER (INPATIENT)
Dept: CARDIOLOGY | Facility: MEDICAL CENTER | Age: 47
DRG: 853 | End: 2022-02-17
Attending: THORACIC SURGERY (CARDIOTHORACIC VASCULAR SURGERY)
Payer: COMMERCIAL

## 2022-02-17 ENCOUNTER — HOSPITAL ENCOUNTER (INPATIENT)
Dept: RADIOLOGY | Facility: MEDICAL CENTER | Age: 47
DRG: 853 | End: 2022-02-17
Attending: INTERNAL MEDICINE
Payer: COMMERCIAL

## 2022-02-17 ENCOUNTER — ANESTHESIA EVENT (OUTPATIENT)
Dept: SURGERY | Facility: MEDICAL CENTER | Age: 47
DRG: 853 | End: 2022-02-17
Payer: COMMERCIAL

## 2022-02-17 ENCOUNTER — APPOINTMENT (OUTPATIENT)
Dept: CARDIOLOGY | Facility: MEDICAL CENTER | Age: 47
DRG: 853 | End: 2022-02-17
Attending: INTERNAL MEDICINE
Payer: COMMERCIAL

## 2022-02-17 ENCOUNTER — ANESTHESIA (OUTPATIENT)
Dept: SURGERY | Facility: MEDICAL CENTER | Age: 47
DRG: 853 | End: 2022-02-17
Payer: COMMERCIAL

## 2022-02-17 VITALS
WEIGHT: 163.8 LBS | HEIGHT: 68 IN | DIASTOLIC BLOOD PRESSURE: 72 MMHG | TEMPERATURE: 98.6 F | RESPIRATION RATE: 14 BRPM | HEART RATE: 104 BPM | SYSTOLIC BLOOD PRESSURE: 86 MMHG | BODY MASS INDEX: 24.83 KG/M2 | OXYGEN SATURATION: 65 %

## 2022-02-17 DIAGNOSIS — I46.9 CARDIAC ARREST (HCC): ICD-10-CM

## 2022-02-17 LAB
ABO + RH BLD: NORMAL
ABO GROUP BLD: NORMAL
ACT BLD: 172 SEC (ref 74–137)
ACT BLD: 172 SEC (ref 74–137)
ACT BLD: 178 SEC (ref 74–137)
ACT BLD: 184 SEC (ref 74–137)
ACT BLD: 273 SEC (ref 74–137)
ALBUMIN SERPL BCP-MCNC: 2.2 G/DL (ref 3.2–4.9)
ALBUMIN/GLOB SERPL: 0.8 G/DL
ALP SERPL-CCNC: 51 U/L (ref 30–99)
ALT SERPL-CCNC: 101 U/L (ref 2–50)
ANION GAP SERPL CALC-SCNC: 10 MMOL/L (ref 7–16)
ANION GAP SERPL CALC-SCNC: 11 MMOL/L (ref 7–16)
AST SERPL-CCNC: 379 U/L (ref 12–45)
BARCODED ABORH UBTYP: 5100
BARCODED PRD CODE UBPRD: NORMAL
BARCODED UNIT NUM UBUNT: NORMAL
BASE EXCESS BLDA CALC-SCNC: -1 MMOL/L (ref -4–3)
BASE EXCESS BLDA CALC-SCNC: 0 MMOL/L (ref -4–3)
BASE EXCESS BLDA CALC-SCNC: 0 MMOL/L (ref -4–3)
BASE EXCESS BLDA CALC-SCNC: 6 MMOL/L (ref -4–3)
BASE EXCESS BLDV CALC-SCNC: 2 MMOL/L (ref -4–3)
BILIRUB SERPL-MCNC: 1.3 MG/DL (ref 0.1–1.5)
BLD GP AB SCN SERPL QL: NORMAL
BODY TEMPERATURE: ABNORMAL DEGREES
BODY TEMPERATURE: NORMAL DEGREES
BREATHS SETTING VENT: 32
BUN SERPL-MCNC: 32 MG/DL (ref 8–22)
BUN SERPL-MCNC: 34 MG/DL (ref 8–22)
CA-I BLD ISE-SCNC: 0.85 MMOL/L (ref 1.1–1.3)
CA-I BLD ISE-SCNC: 0.89 MMOL/L (ref 1.1–1.3)
CA-I BLD ISE-SCNC: 0.89 MMOL/L (ref 1.1–1.3)
CALCIUM SERPL-MCNC: 7.1 MG/DL (ref 8.5–10.5)
CALCIUM SERPL-MCNC: 7.3 MG/DL (ref 8.5–10.5)
CHLORIDE SERPL-SCNC: 103 MMOL/L (ref 96–112)
CHLORIDE SERPL-SCNC: 104 MMOL/L (ref 96–112)
CO2 BLDA-SCNC: 24 MMOL/L (ref 20–33)
CO2 BLDA-SCNC: 25 MMOL/L (ref 20–33)
CO2 BLDA-SCNC: 26 MMOL/L (ref 20–33)
CO2 BLDA-SCNC: 30 MMOL/L (ref 20–33)
CO2 BLDV-SCNC: 28 MMOL/L (ref 20–33)
CO2 SERPL-SCNC: 22 MMOL/L (ref 20–33)
CO2 SERPL-SCNC: 23 MMOL/L (ref 20–33)
COMPONENT F 8504F: NORMAL
COMPONENT R 8504R: NORMAL
COMPONENT R 8504R: NORMAL
CREAT SERPL-MCNC: 0.71 MG/DL (ref 0.5–1.4)
CREAT SERPL-MCNC: 0.73 MG/DL (ref 0.5–1.4)
DELSYS IDSYS: ABNORMAL
EKG IMPRESSION: NORMAL
END TIDAL CARBON DIOXIDE IECO2: 25 MMHG
END TIDAL CARBON DIOXIDE IECO2: 34 MMHG
END TIDAL CARBON DIOXIDE IECO2: 35 MMHG
END TIDAL CARBON DIOXIDE IECO2: 41 MMHG
ERYTHROCYTE [DISTWIDTH] IN BLOOD BY AUTOMATED COUNT: 43 FL (ref 35.9–50)
GLOBULIN SER CALC-MCNC: 2.7 G/DL (ref 1.9–3.5)
GLUCOSE BLD-MCNC: 132 MG/DL (ref 65–99)
GLUCOSE BLD-MCNC: 163 MG/DL (ref 65–99)
GLUCOSE BLD-MCNC: 177 MG/DL (ref 65–99)
GLUCOSE BLD-MCNC: 184 MG/DL (ref 65–99)
GLUCOSE BLD-MCNC: 199 MG/DL (ref 65–99)
GLUCOSE BLD-MCNC: 204 MG/DL (ref 65–99)
GLUCOSE BLD-MCNC: 227 MG/DL (ref 65–99)
GLUCOSE BLD-MCNC: 236 MG/DL (ref 65–99)
GLUCOSE BLD-MCNC: 238 MG/DL (ref 65–99)
GLUCOSE BLD-MCNC: 242 MG/DL (ref 65–99)
GLUCOSE BLD-MCNC: 254 MG/DL (ref 65–99)
GLUCOSE BLD-MCNC: 255 MG/DL (ref 65–99)
GLUCOSE BLD-MCNC: 263 MG/DL (ref 65–99)
GLUCOSE BLD-MCNC: 275 MG/DL (ref 65–99)
GLUCOSE BLD-MCNC: 280 MG/DL (ref 65–99)
GLUCOSE SERPL-MCNC: 210 MG/DL (ref 65–99)
GLUCOSE SERPL-MCNC: 293 MG/DL (ref 65–99)
HCO3 BLDA-SCNC: 23 MMOL/L (ref 17–25)
HCO3 BLDA-SCNC: 24.1 MMOL/L (ref 17–25)
HCO3 BLDA-SCNC: 24.1 MMOL/L (ref 17–25)
HCO3 BLDA-SCNC: 24.2 MMOL/L (ref 17–25)
HCO3 BLDA-SCNC: 24.8 MMOL/L (ref 17–25)
HCO3 BLDA-SCNC: 28.7 MMOL/L (ref 17–25)
HCO3 BLDV-SCNC: 27.1 MMOL/L (ref 24–28)
HCT VFR BLD AUTO: 27.6 % (ref 42–52)
HCT VFR BLD CALC: 19 % (ref 42–52)
HCT VFR BLD CALC: 19 % (ref 42–52)
HCT VFR BLD CALC: 20 % (ref 42–52)
HGB BLD-MCNC: 6.5 G/DL (ref 14–18)
HGB BLD-MCNC: 6.5 G/DL (ref 14–18)
HGB BLD-MCNC: 6.8 G/DL (ref 14–18)
HGB BLD-MCNC: 9.7 G/DL (ref 14–18)
HIGH PRESS HOLD TIME SET VENT: 4.5 S
HOROWITZ INDEX BLDA+IHG-RTO: 132 MM[HG]
HOROWITZ INDEX BLDA+IHG-RTO: 145 MM[HG]
HOROWITZ INDEX BLDA+IHG-RTO: 240 MM[HG]
HOROWITZ INDEX BLDA+IHG-RTO: 62 MM[HG]
LACTATE BLD-SCNC: 2 MMOL/L (ref 0.5–2)
LACTATE BLD-SCNC: 2.4 MMOL/L (ref 0.5–2)
LOW PRESS HOLD TIME SET VENT: 0.5 S
LOW PRESS HOLD TIME SET VENT: 0.5 S
LOW PRESS HOLD TIME SET VENT: 5 S
MAGNESIUM SERPL-MCNC: 2.2 MG/DL (ref 1.5–2.5)
MCH RBC QN AUTO: 32.4 PG (ref 27–33)
MCHC RBC AUTO-ENTMCNC: 35.1 G/DL (ref 33.7–35.3)
MCV RBC AUTO: 92.3 FL (ref 81.4–97.8)
MODE IMODE: ABNORMAL
O2/TOTAL GAS SETTING VFR VENT: 100 %
O2/TOTAL GAS SETTING VFR VENT: 60 %
PCO2 BLDA: 30.5 MMHG (ref 26–37)
PCO2 BLDA: 35 MMHG (ref 26–37)
PCO2 BLDA: 36.5 MMHG (ref 26–37)
PCO2 BLDA: 40.9 MMHG (ref 26–37)
PCO2 BLDA: 42 MMHG (ref 26–37)
PCO2 BLDA: 42.4 MMHG (ref 26–37)
PCO2 BLDV: 46.5 MMHG (ref 41–51)
PCO2 TEMP ADJ BLDA: 33.9 MMHG (ref 26–37)
PCO2 TEMP ADJ BLDA: 40.4 MMHG (ref 26–37)
PCO2 TEMP ADJ BLDA: 40.9 MMHG (ref 26–37)
PCO2 TEMP ADJ BLDA: 43 MMHG (ref 26–37)
PEEP END EXPIRATORY PRESSURE IPEEP: 12 CMH20
PH BLDA: 7.37 [PH] (ref 7.4–7.5)
PH BLDA: 7.38 [PH] (ref 7.4–7.5)
PH BLDA: 7.38 [PH] (ref 7.4–7.5)
PH BLDA: 7.43 [PH] (ref 7.4–7.5)
PH BLDA: 7.43 [PH] (ref 7.4–7.5)
PH BLDA: 7.58 [PH] (ref 7.4–7.5)
PH BLDV: 7.37 [PH] (ref 7.31–7.45)
PH TEMP ADJ BLDA: 7.37 [PH] (ref 7.4–7.5)
PH TEMP ADJ BLDA: 7.38 [PH] (ref 7.4–7.5)
PH TEMP ADJ BLDA: 7.38 [PH] (ref 7.4–7.5)
PH TEMP ADJ BLDA: 7.44 [PH] (ref 7.4–7.5)
PHOSPHATE SERPL-MCNC: 1 MG/DL (ref 2.5–4.5)
PLATELET # BLD AUTO: 120 K/UL (ref 164–446)
PMV BLD AUTO: 12 FL (ref 9–12.9)
PO2 BLDA: 132 MMHG (ref 64–87)
PO2 BLDA: 240 MMHG (ref 64–87)
PO2 BLDA: 441 MMHG (ref 64–87)
PO2 BLDA: 62 MMHG (ref 64–87)
PO2 BLDA: 63 MMHG (ref 64–87)
PO2 BLDA: 87 MMHG (ref 64–87)
PO2 BLDV: 38 MMHG (ref 25–40)
PO2 TEMP ADJ BLDA: 130 MMHG (ref 64–87)
PO2 TEMP ADJ BLDA: 237 MMHG (ref 64–87)
PO2 TEMP ADJ BLDA: 59 MMHG (ref 64–87)
PO2 TEMP ADJ BLDA: 89 MMHG (ref 64–87)
POTASSIUM BLD-SCNC: 2.9 MMOL/L (ref 3.6–5.5)
POTASSIUM BLD-SCNC: 3 MMOL/L (ref 3.6–5.5)
POTASSIUM BLD-SCNC: 3.2 MMOL/L (ref 3.6–5.5)
POTASSIUM SERPL-SCNC: 3.6 MMOL/L (ref 3.6–5.5)
POTASSIUM SERPL-SCNC: 3.6 MMOL/L (ref 3.6–5.5)
POTASSIUM SERPL-SCNC: 3.8 MMOL/L (ref 3.6–5.5)
PRESSURE HIGH  IPRHI: 32
PRESSURE LOW IPRLO: 0
PRESSURE SUPPORT SETTING VENT: 0 CM[H2O]
PRODUCT TYPE UPROD: NORMAL
PROT SERPL-MCNC: 4.9 G/DL (ref 6–8.2)
RBC # BLD AUTO: 2.99 M/UL (ref 4.7–6.1)
RH BLD: NORMAL
SAO2 % BLDA: 100 % (ref 93–99)
SAO2 % BLDA: 100 % (ref 93–99)
SAO2 % BLDA: 92 % (ref 93–99)
SAO2 % BLDA: 92 % (ref 93–99)
SAO2 % BLDA: 96 % (ref 93–99)
SAO2 % BLDA: 99 % (ref 93–99)
SAO2 % BLDV: 70 %
SODIUM BLD-SCNC: 138 MMOL/L (ref 135–145)
SODIUM BLD-SCNC: 138 MMOL/L (ref 135–145)
SODIUM BLD-SCNC: 139 MMOL/L (ref 135–145)
SODIUM SERPL-SCNC: 136 MMOL/L (ref 135–145)
SODIUM SERPL-SCNC: 137 MMOL/L (ref 135–145)
SPECIMEN DRAWN FROM PATIENT: ABNORMAL
SPECIMEN DRAWN FROM PATIENT: NORMAL
TIDAL VOLUME IVT: 420 ML
UNIT STATUS USTAT: NORMAL
WBC # BLD AUTO: 12.8 K/UL (ref 4.8–10.8)

## 2022-02-17 PROCEDURE — 80053 COMPREHEN METABOLIC PANEL: CPT

## 2022-02-17 PROCEDURE — 700101 HCHG RX REV CODE 250: Performed by: ANESTHESIOLOGY

## 2022-02-17 PROCEDURE — 700102 HCHG RX REV CODE 250 W/ 637 OVERRIDE(OP): Performed by: INTERNAL MEDICINE

## 2022-02-17 PROCEDURE — 93010 ELECTROCARDIOGRAM REPORT: CPT | Performed by: INTERNAL MEDICINE

## 2022-02-17 PROCEDURE — 02WAXRZ REVISION OF SHORT-TERM EXTERNAL HEART ASSIST SYSTEM IN HEART, EXTERNAL APPROACH: ICD-10-PCS | Performed by: INTERNAL MEDICINE

## 2022-02-17 PROCEDURE — 84132 ASSAY OF SERUM POTASSIUM: CPT | Mod: 91

## 2022-02-17 PROCEDURE — 93308 TTE F-UP OR LMTD: CPT | Mod: 26 | Performed by: INTERNAL MEDICINE

## 2022-02-17 PROCEDURE — 94003 VENT MGMT INPAT SUBQ DAY: CPT

## 2022-02-17 PROCEDURE — 160029 HCHG SURGERY MINUTES - 1ST 30 MINS LEVEL 4: Performed by: THORACIC SURGERY (CARDIOTHORACIC VASCULAR SURGERY)

## 2022-02-17 PROCEDURE — 700105 HCHG RX REV CODE 258: Performed by: INTERNAL MEDICINE

## 2022-02-17 PROCEDURE — 84295 ASSAY OF SERUM SODIUM: CPT | Mod: 91

## 2022-02-17 PROCEDURE — C1725 CATH, TRANSLUMIN NON-LASER: HCPCS | Performed by: THORACIC SURGERY (CARDIOTHORACIC VASCULAR SURGERY)

## 2022-02-17 PROCEDURE — 80048 BASIC METABOLIC PNL TOTAL CA: CPT

## 2022-02-17 PROCEDURE — 37799 UNLISTED PX VASCULAR SURGERY: CPT

## 2022-02-17 PROCEDURE — 82962 GLUCOSE BLOOD TEST: CPT | Mod: 91

## 2022-02-17 PROCEDURE — 93005 ELECTROCARDIOGRAM TRACING: CPT | Performed by: INTERNAL MEDICINE

## 2022-02-17 PROCEDURE — 700111 HCHG RX REV CODE 636 W/ 250 OVERRIDE (IP): Performed by: INTERNAL MEDICINE

## 2022-02-17 PROCEDURE — 30243N1 TRANSFUSION OF NONAUTOLOGOUS RED BLOOD CELLS INTO CENTRAL VEIN, PERCUTANEOUS APPROACH: ICD-10-PCS | Performed by: INTERNAL MEDICINE

## 2022-02-17 PROCEDURE — 700111 HCHG RX REV CODE 636 W/ 250 OVERRIDE (IP)

## 2022-02-17 PROCEDURE — P9016 RBC LEUKOCYTES REDUCED: HCPCS

## 2022-02-17 PROCEDURE — 160048 HCHG OR STATISTICAL LEVEL 1-5: Performed by: THORACIC SURGERY (CARDIOTHORACIC VASCULAR SURGERY)

## 2022-02-17 PROCEDURE — 30243L1 TRANSFUSION OF NONAUTOLOGOUS FRESH PLASMA INTO CENTRAL VEIN, PERCUTANEOUS APPROACH: ICD-10-PCS | Performed by: INTERNAL MEDICINE

## 2022-02-17 PROCEDURE — 86901 BLOOD TYPING SEROLOGIC RH(D): CPT

## 2022-02-17 PROCEDURE — 86900 BLOOD TYPING SEROLOGIC ABO: CPT

## 2022-02-17 PROCEDURE — 86850 RBC ANTIBODY SCREEN: CPT

## 2022-02-17 PROCEDURE — 99292 CRITICAL CARE ADDL 30 MIN: CPT | Performed by: INTERNAL MEDICINE

## 2022-02-17 PROCEDURE — 700101 HCHG RX REV CODE 250: Performed by: INTERNAL MEDICINE

## 2022-02-17 PROCEDURE — 82330 ASSAY OF CALCIUM: CPT | Mod: 91

## 2022-02-17 PROCEDURE — 503001 HCHG PERFUSION: Performed by: THORACIC SURGERY (CARDIOTHORACIC VASCULAR SURGERY)

## 2022-02-17 PROCEDURE — 83605 ASSAY OF LACTIC ACID: CPT

## 2022-02-17 PROCEDURE — 84100 ASSAY OF PHOSPHORUS: CPT

## 2022-02-17 PROCEDURE — 86923 COMPATIBILITY TEST ELECTRIC: CPT

## 2022-02-17 PROCEDURE — A9270 NON-COVERED ITEM OR SERVICE: HCPCS | Performed by: INTERNAL MEDICINE

## 2022-02-17 PROCEDURE — 83735 ASSAY OF MAGNESIUM: CPT

## 2022-02-17 PROCEDURE — 93312 ECHO TRANSESOPHAGEAL: CPT

## 2022-02-17 PROCEDURE — 36430 TRANSFUSION BLD/BLD COMPNT: CPT

## 2022-02-17 PROCEDURE — C1751 CATH, INF, PER/CENT/MIDLINE: HCPCS | Performed by: THORACIC SURGERY (CARDIOTHORACIC VASCULAR SURGERY)

## 2022-02-17 PROCEDURE — 99233 SBSQ HOSP IP/OBS HIGH 50: CPT | Performed by: INTERNAL MEDICINE

## 2022-02-17 PROCEDURE — 71045 X-RAY EXAM CHEST 1 VIEW: CPT

## 2022-02-17 PROCEDURE — 82803 BLOOD GASES ANY COMBINATION: CPT | Mod: 91

## 2022-02-17 PROCEDURE — 700111 HCHG RX REV CODE 636 W/ 250 OVERRIDE (IP): Performed by: STUDENT IN AN ORGANIZED HEALTH CARE EDUCATION/TRAINING PROGRAM

## 2022-02-17 PROCEDURE — 160009 HCHG ANES TIME/MIN: Performed by: THORACIC SURGERY (CARDIOTHORACIC VASCULAR SURGERY)

## 2022-02-17 PROCEDURE — 85027 COMPLETE CBC AUTOMATED: CPT

## 2022-02-17 PROCEDURE — C1894 INTRO/SHEATH, NON-LASER: HCPCS | Performed by: THORACIC SURGERY (CARDIOTHORACIC VASCULAR SURGERY)

## 2022-02-17 PROCEDURE — 93308 TTE F-UP OR LMTD: CPT

## 2022-02-17 PROCEDURE — 700111 HCHG RX REV CODE 636 W/ 250 OVERRIDE (IP): Performed by: ANESTHESIOLOGY

## 2022-02-17 PROCEDURE — C1769 GUIDE WIRE: HCPCS | Performed by: THORACIC SURGERY (CARDIOTHORACIC VASCULAR SURGERY)

## 2022-02-17 PROCEDURE — 94799 UNLISTED PULMONARY SVC/PX: CPT

## 2022-02-17 PROCEDURE — 36556 INSERT NON-TUNNEL CV CATH: CPT

## 2022-02-17 PROCEDURE — 160041 HCHG SURGERY MINUTES - EA ADDL 1 MIN LEVEL 4: Performed by: THORACIC SURGERY (CARDIOTHORACIC VASCULAR SURGERY)

## 2022-02-17 PROCEDURE — 33947 ECMO/ECLS INITIATION ARTERY: CPT | Performed by: THORACIC SURGERY (CARDIOTHORACIC VASCULAR SURGERY)

## 2022-02-17 PROCEDURE — 33952 ECMO/ECLS INSJ PRPH CANNULA: CPT | Performed by: THORACIC SURGERY (CARDIOTHORACIC VASCULAR SURGERY)

## 2022-02-17 PROCEDURE — 85347 COAGULATION TIME ACTIVATED: CPT

## 2022-02-17 PROCEDURE — 99291 CRITICAL CARE FIRST HOUR: CPT | Performed by: INTERNAL MEDICINE

## 2022-02-17 PROCEDURE — 85014 HEMATOCRIT: CPT

## 2022-02-17 PROCEDURE — 503000 HCHG SUTURE, OHS: Performed by: THORACIC SURGERY (CARDIOTHORACIC VASCULAR SURGERY)

## 2022-02-17 RX ORDER — CALCIUM CHLORIDE 100 MG/ML
INJECTION INTRAVENOUS; INTRAVENTRICULAR PRN
Status: DISCONTINUED | OUTPATIENT
Start: 2022-02-17 | End: 2022-02-17 | Stop reason: SURG

## 2022-02-17 RX ORDER — FUROSEMIDE 10 MG/ML
20 INJECTION INTRAMUSCULAR; INTRAVENOUS ONCE
Status: COMPLETED | OUTPATIENT
Start: 2022-02-17 | End: 2022-02-17

## 2022-02-17 RX ORDER — MIDAZOLAM HYDROCHLORIDE 1 MG/ML
2 INJECTION INTRAMUSCULAR; INTRAVENOUS ONCE
Status: COMPLETED | OUTPATIENT
Start: 2022-02-17 | End: 2022-02-17

## 2022-02-17 RX ORDER — ACETAMINOPHEN 325 MG/1
650 TABLET ORAL EVERY 6 HOURS PRN
Status: DISCONTINUED | OUTPATIENT
Start: 2022-02-17 | End: 2022-02-17 | Stop reason: HOSPADM

## 2022-02-17 RX ORDER — POTASSIUM CHLORIDE 14.9 MG/ML
20 INJECTION INTRAVENOUS ONCE
Status: COMPLETED | OUTPATIENT
Start: 2022-02-17 | End: 2022-02-17

## 2022-02-17 RX ORDER — MIDAZOLAM HYDROCHLORIDE 1 MG/ML
INJECTION INTRAMUSCULAR; INTRAVENOUS
Status: COMPLETED
Start: 2022-02-17 | End: 2022-02-17

## 2022-02-17 RX ORDER — HEPARIN SODIUM,PORCINE 1000/ML
VIAL (ML) INJECTION PRN
Status: DISCONTINUED | OUTPATIENT
Start: 2022-02-17 | End: 2022-02-17 | Stop reason: SURG

## 2022-02-17 RX ADMIN — ROCURONIUM BROMIDE 50 MG: 10 INJECTION, SOLUTION INTRAVENOUS at 16:02

## 2022-02-17 RX ADMIN — MIDAZOLAM 2 MG: 1 INJECTION INTRAMUSCULAR; INTRAVENOUS at 17:06

## 2022-02-17 RX ADMIN — DOBUTAMINE HYDROCHLORIDE 5 MCG/KG/MIN: 100 INJECTION INTRAVENOUS at 04:39

## 2022-02-17 RX ADMIN — ROCURONIUM BROMIDE 50 MG: 10 INJECTION, SOLUTION INTRAVENOUS at 17:46

## 2022-02-17 RX ADMIN — ASPIRIN 81 MG: 81 TABLET, CHEWABLE ORAL at 05:19

## 2022-02-17 RX ADMIN — SODIUM CHLORIDE 13 UNITS/HR: 9 INJECTION, SOLUTION INTRAVENOUS at 05:42

## 2022-02-17 RX ADMIN — CALCIUM CHLORIDE 1 G: 100 INJECTION INTRAVENOUS; INTRAVENTRICULAR at 17:43

## 2022-02-17 RX ADMIN — FENTANYL CITRATE 100 MCG: 50 INJECTION, SOLUTION INTRAMUSCULAR; INTRAVENOUS at 07:45

## 2022-02-17 RX ADMIN — MIDAZOLAM HYDROCHLORIDE 2 MG: 1 INJECTION INTRAMUSCULAR; INTRAVENOUS at 08:12

## 2022-02-17 RX ADMIN — AMIODARONE HYDROCHLORIDE 150 MG: 1.5 INJECTION, SOLUTION INTRAVENOUS at 03:20

## 2022-02-17 RX ADMIN — FENTANYL CITRATE 100 MCG: 50 INJECTION, SOLUTION INTRAMUSCULAR; INTRAVENOUS at 07:16

## 2022-02-17 RX ADMIN — POTASSIUM PHOSPHATE, MONOBASIC AND POTASSIUM PHOSPHATE, DIBASIC 15 MMOL: 224; 236 INJECTION, SOLUTION, CONCENTRATE INTRAVENOUS at 15:25

## 2022-02-17 RX ADMIN — SODIUM CHLORIDE 8.5 UNITS/HR: 9 INJECTION, SOLUTION INTRAVENOUS at 14:06

## 2022-02-17 RX ADMIN — DOCUSATE SODIUM 50 MG AND SENNOSIDES 8.6 MG 2 TABLET: 8.6; 5 TABLET, FILM COATED ORAL at 05:19

## 2022-02-17 RX ADMIN — POTASSIUM CHLORIDE 20 MEQ: 14.9 INJECTION, SOLUTION INTRAVENOUS at 01:05

## 2022-02-17 RX ADMIN — AMIODARONE HYDROCHLORIDE 1 MG/MIN: 50 INJECTION, SOLUTION INTRAVENOUS at 14:41

## 2022-02-17 RX ADMIN — FAMOTIDINE 20 MG: 20 TABLET ORAL at 05:19

## 2022-02-17 RX ADMIN — HEPARIN SODIUM 395 UNITS/HR: 1000 INJECTION, SOLUTION INTRAVENOUS; SUBCUTANEOUS at 07:04

## 2022-02-17 RX ADMIN — MIDAZOLAM 2 MG: 1 INJECTION INTRAMUSCULAR; INTRAVENOUS at 08:12

## 2022-02-17 RX ADMIN — POTASSIUM PHOSPHATE, MONOBASIC AND POTASSIUM PHOSPHATE, DIBASIC 30 MMOL: 224; 236 INJECTION, SOLUTION, CONCENTRATE INTRAVENOUS at 09:10

## 2022-02-17 RX ADMIN — Medication 300 MCG/HR: at 15:39

## 2022-02-17 RX ADMIN — Medication 300 MCG/HR: at 17:34

## 2022-02-17 RX ADMIN — HEPARIN SODIUM 392.5 UNITS/HR: 1000 INJECTION, SOLUTION INTRAVENOUS; SUBCUTANEOUS at 15:00

## 2022-02-17 RX ADMIN — DOBUTAMINE HYDROCHLORIDE 5 MCG/KG/MIN: 100 INJECTION INTRAVENOUS at 14:41

## 2022-02-17 RX ADMIN — ROCURONIUM BROMIDE 50 MG: 10 INJECTION, SOLUTION INTRAVENOUS at 17:06

## 2022-02-17 RX ADMIN — FENTANYL CITRATE 100 MCG: 50 INJECTION, SOLUTION INTRAMUSCULAR; INTRAVENOUS at 02:45

## 2022-02-17 RX ADMIN — PRASUGREL 10 MG: 10 TABLET, FILM COATED ORAL at 09:47

## 2022-02-17 RX ADMIN — FUROSEMIDE 20 MG: 10 INJECTION, SOLUTION INTRAMUSCULAR; INTRAVENOUS at 08:31

## 2022-02-17 RX ADMIN — SODIUM CHLORIDE 3 G: 900 INJECTION INTRAVENOUS at 05:19

## 2022-02-17 RX ADMIN — HEPARIN SODIUM 395 UNITS/HR: 1000 INJECTION, SOLUTION INTRAVENOUS; SUBCUTANEOUS at 13:04

## 2022-02-17 RX ADMIN — AMIODARONE HYDROCHLORIDE 150 MG: 1.5 INJECTION, SOLUTION INTRAVENOUS at 07:36

## 2022-02-17 RX ADMIN — Medication 300 MCG/HR: at 08:41

## 2022-02-17 RX ADMIN — POTASSIUM CHLORIDE 20 MEQ: 14.9 INJECTION, SOLUTION INTRAVENOUS at 06:29

## 2022-02-17 RX ADMIN — DEXMEDETOMIDINE 1.1 MCG/KG/HR: 200 INJECTION, SOLUTION INTRAVENOUS at 03:07

## 2022-02-17 RX ADMIN — HEPARIN SODIUM 5000 UNITS: 1000 INJECTION, SOLUTION INTRAVENOUS; SUBCUTANEOUS at 16:30

## 2022-02-17 RX ADMIN — FUROSEMIDE 20 MG: 10 INJECTION, SOLUTION INTRAMUSCULAR; INTRAVENOUS at 05:20

## 2022-02-17 RX ADMIN — SODIUM CHLORIDE 3 G: 900 INJECTION INTRAVENOUS at 12:20

## 2022-02-17 RX ADMIN — MIDAZOLAM 2 MG/HR: 5 INJECTION INTRAMUSCULAR; INTRAVENOUS at 08:11

## 2022-02-17 RX ADMIN — HEPARIN SODIUM 392.5 UNITS/HR: 1000 INJECTION, SOLUTION INTRAVENOUS; SUBCUTANEOUS at 08:00

## 2022-02-17 RX ADMIN — MIDAZOLAM 2 MG: 1 INJECTION INTRAMUSCULAR; INTRAVENOUS at 16:02

## 2022-02-17 RX ADMIN — SODIUM BICARBONATE 100 MEQ: 84 INJECTION, SOLUTION INTRAVENOUS at 16:25

## 2022-02-17 RX ADMIN — FENTANYL CITRATE 100 MCG: 50 INJECTION, SOLUTION INTRAMUSCULAR; INTRAVENOUS at 07:02

## 2022-02-17 RX ADMIN — CALCIUM CHLORIDE 1 G: 100 INJECTION INTRAVENOUS; INTRAVENTRICULAR at 17:30

## 2022-02-17 RX ADMIN — HYDROCORTISONE SODIUM SUCCINATE 100 MG: 100 INJECTION, POWDER, FOR SOLUTION INTRAMUSCULAR; INTRAVENOUS at 05:20

## 2022-02-17 ASSESSMENT — PAIN DESCRIPTION - PAIN TYPE
TYPE: ACUTE PAIN

## 2022-02-17 NOTE — CARE PLAN
Problem: Ventilation  Goal: Ability to achieve and maintain unassisted ventilation or tolerate decreased levels of ventilator support  Description: Target End Date:  4 days     Document on Vent flowsheet    1.  Support and monitor invasive and noninvasive mechanical ventilation  2.  Monitor ventilator weaning response  3.  Perform ventilator associated pneumonia prevention interventions  4.  Manage ventilation therapy by monitoring diagnostic test results  Outcome: Not Met   Vent day 5   ETT 7.5 @ 25   APVcmv 32 420 14 100%

## 2022-02-17 NOTE — CARE PLAN
The patient is Watcher - Medium risk of patient condition declining or worsening    Shift Goals  Clinical Goals: hemodynamic stability, HR control  Patient Goals: darío  Family Goals: darío    Progress made toward(s) clinical / shift goals:  Hemodynamic Stability      Problem: Safety - Medical Restraint  Goal: Remains free of injury from restraints (Restraint for Interference with Medical Device)  Outcome: Progressing  Goal: Free from restraint(s) (Restraint for Interference with Medical Device)  Outcome: Progressing        Patient is not progressing towards the following goals:      Problem: Knowledge Deficit - Standard  Goal: Patient and family/care givers will demonstrate understanding of plan of care, disease process/condition, diagnostic tests and medications  Outcome: Not Progressing     Problem: Pain - Standard  Goal: Alleviation of pain or a reduction in pain to the patient’s comfort goal  Outcome: Not Progressing

## 2022-02-17 NOTE — DISCHARGE PLANNING
Care Transition Team Discharge Planning    Anticipated Discharge Disposition: pt reportedly has an open bed at acute hospital in [place]    Action: Fetsusw spoke to bedside RN who received call from [facility]. They indicate pt has an open bed there now and needs to have his insurance, [employer], start insurance auth immediately.    Lsw called [employer] @ [phone] or [phone]. Lsw spoke to prior auth RN w/ [employer] who is now aware pt was admitted to [facility] on 2.13 to the CICU. They started a case for pt w/ case number [id number].    They indicate the clinicals need to be sent to them via fax number [phone].    Festusw called [facility] UR RN Allision and she will be able to send clinicals once pt's INS information is provided and entered w/ [employer].    Lsw called [employer] and they will enter pt's  [employer] member number [phone] now.    Lsw explained the situation w/ [place] acute [facility] having an open bed, and if pt is able to go today or not.         Barriers to Discharge: medical clearance, INS AUTH, transport    Plan: Lsw provided direct contact number, and will wait for INS updates and MD official medical clearance before setting up d/c transport to [facility] who is indicating they have an open bed now

## 2022-02-17 NOTE — PROGRESS NOTES
Cardiology Follow Up Progress Note    Date of Service  2/17/2022    Attending Physician  Anita Aguilera M.D.    Chief Complaint   Status post arrest, dilated cardiomyopathy, cardiogenic shock with Impella support, coronary artery disease, status post percutaneous intervention with stent placements.    HPI  Mina Dimas is a 46 y.o. male admitted 2/13/2022 with above.    Interim Events  He underwent cardiac catheterization with Impella placement and complex PCI, however, he is not improving  Hemodynamically.     Review of Systems  Review of Systems   Unable to perform ROS: Intubated       Vital signs in last 24 hours  Temp:  [36 °C (96.8 °F)-39.9 °C (103.8 °F)] 36.4 °C (97.5 °F)  Pulse:  [] 90  Resp:  [1-32] 11  BP: ()/(49-81) 86/72  SpO2:  [68 %-100 %] 68 %    Physical Exam  Physical Exam  Constitutional:       Interventions: He is sedated and intubated.   HENT:      Head: Normocephalic and atraumatic.   Eyes:      Comments: Closed.   Cardiovascular:      Rate and Rhythm: Normal rate and regular rhythm.   Pulmonary:      Effort: He is intubated.      Breath sounds: Normal breath sounds.   Abdominal:      General: Bowel sounds are normal.      Palpations: Abdomen is soft.   Skin:     General: Skin is warm and dry.         Lab Review  Lab Results   Component Value Date/Time    WBC 12.8 (H) 02/17/2022 05:00 AM    RBC 2.99 (L) 02/17/2022 05:00 AM    HEMOGLOBIN 9.7 (L) 02/17/2022 05:00 AM    HEMATOCRIT 27.6 (L) 02/17/2022 05:00 AM    MCV 92.3 02/17/2022 05:00 AM    MCH 32.4 02/17/2022 05:00 AM    MCHC 35.1 02/17/2022 05:00 AM    MPV 12.0 02/17/2022 05:00 AM      Lab Results   Component Value Date/Time    SODIUM 136 02/17/2022 05:00 AM    POTASSIUM 3.6 02/17/2022 05:00 AM    CHLORIDE 104 02/17/2022 05:00 AM    CO2 22 02/17/2022 05:00 AM    GLUCOSE 293 (H) 02/17/2022 05:00 AM    BUN 32 (H) 02/17/2022 05:00 AM    CREATININE 0.71 02/17/2022 05:00 AM      Lab Results   Component Value Date/Time     ASTSGOT 379 (H) 02/17/2022 05:00 AM    ALTSGPT 101 (H) 02/17/2022 05:00 AM     Lab Results   Component Value Date/Time    CHOLSTRLTOT 114 02/15/2022 05:50 AM    LDL 23 02/15/2022 05:50 AM    HDL 40 02/15/2022 05:50 AM    TRIGLYCERIDE 253 (H) 02/15/2022 05:50 AM    TROPONINT 1987 (H) 02/14/2022 05:50 AM       Recent Labs     02/16/22  1315   NTPROBNP 7260*       Cardiac Imaging and Procedures Review  CARDIAC STUDIES/PROCEDURES:    CARDIAC CATHETERIZATION CONCLUSIONS by Arturo Scales (02/16/22)  1. NSTEMI with cardiogenic shock due to severe LM and three vessel CAD  2. Successful PCI of the left main bifurcation into LAD and circumflex using DK Crush technique, IVUS guidance, 2 DARYL; (4.0 x 26 mm [place] drug-eluting stent from the left main, into the circumflex, 3.5 x 12 mm [place] stent at the distal edge, 3.0 x 30 mm [place] drug-eluting stent from the left main into the LAD)  3. Cardiogenic shock, improved with Impella CP placement  4. Residual occlusion of the RCA and moderate disease in the distal circumflex.   5. Incidental note of severe PAD involving the right external iliac artery  (study result reviewed)    ECHOCARDIOGRAM CONCLUSIONS (02/17/22)  Impella device measuring 3.5 cm from aortic annulus to catheter tip.  (study result reviewed)    ECHOCARDIOGRAM CONCLUSIONS (02/16/22)  Compared to the images of the prior study 2-, there has been no change.  Severely reduced left ventricular systolic function.   The left ventricular ejection fraction is visually estimated to be 10%.   Impella device measuring 3.5 cm from aortic annulus to catheter tip.   (study result reviewed)    ECHOCARDIOGRAM CONCLUSIONS (02/16/22)  Compared to the images of the prior study 2-, there has been no change.  Severely reduced left ventricular systolic function.   The left ventricular ejection fraction is visually estimated to be 20%.  Global hypokinesis with apical akinesis. No apical thrombus seen.  No pericardial  effusion.  (study result reviewed)    ECHOCARDIOGRAM CONCLUSIONS (02/14/22)  Severely reduced left ventricular systolic function.   The left ventricular ejection fraction is visually estimated to be 30%.   Global hypokinesis with regional variation and apical akinesis.  The right ventricle is normal in size and systolic function.  No significant valvular abnormalities.  No prior study is available for comparison. Cardiology team following   the patient, aware of results.     EKG performed on (02/17/22) was reviewed: EKG personally interpreted shows sinus tachycardia.  EKG performed on (02/13/21) EKG shows sinus rhythm with non-specific intra-ventricular conduction delay.    Assessment/Plan  1. Out of hospital arrest, dilated cardiomyopathy, cardiogenic shock with Impella support, complex multivessel coronary artery disease, percutaneous intervention with stent placements: He is hemodynamically not improving. We will transfer to [place] for upgrade to assist left ventricular function. I have personally spoken with Joe Wang at [facility] who will accept the patient if approved by insurance.     Addendum: [place] has approved transfer to any facility as they can not accommodate our transfer.      Thank you for allowing me to participate in the care of this patient.  I will continue to follow this patient    Please contact me with any questions.    Chapin May M.D.   Cardiologist, [facility]  [phone]

## 2022-02-17 NOTE — PROGRESS NOTES
Critical Care Progress Note    Date of admission  2/13/2022    Chief Complaint  46 y.o. male admitted 2/13/2022 with out of hospital cardiac arrest    Hospital Course  Mr. Dimas is a 46 year old male with the past medical history of CAD and diabetes [place]er CPR and defibrillated out of VF/VT.  The patient was intubated and brought to [facility].  Cardiology is following and started on heparin drip and aspirin.  2/14 - VD#2, ?seizure, troponin to 1987, low UOP  2/15 - VD#3, worsening shock-->a-line and Sayre placed, Levo, vaso, and dobutamine.  Unasyn/vanco started  2/16 - VD#4, following all commands, s/p methylene blue for refractory shock, EF worsened 10-20%-->cards took to cath lab with Impella placed, [facility] with successful PCI of the left main bifurcation into LAD and circumflex with 2 DARYL, residual occlusion of the RCA and moderate disease in the distal circumflex, and severe PAD of the right external iliac artery, ongoing shock    Interval Problem Update  Reviewed last 24 hour events:   - pt in and out of a-fib with RVR at night requiring several boluses of amiodarone and then starting an amio drip   - RN did SAT-->pt moving all and following commands   - SR/ST/afib 80-140s   - SBP 80-90s   - 2 doses of lasix with good UOP   - no pedal pulses   - CVP 14   - TFs at goal   - BMS in place with tube feeds color   - UOP with 975cc with rich   - right axillary a line   - right IJ TLC and Sayre-Kamla   - left femoral Impella at 89cm   - levo at 30   - dobutamine at 5   - amio 1   - versed at 2, fent at 300   - insulin 12   - vaso off   - epi at 5   - vent day #5   - CXR(reviewed): worsening bilateral opacities   - APRV, pO2 at 130-->240 with pHigh of 32, tHigh of 4.5, tLow 0.5, pLow of 0   - heparin   - pepcid   - no SAT   - day 3/5 of unasyn   - hydrocortisone-->stopped   - trach aspirate with MSSA   - Blood cultures x 2 negative   - creat at 0.7   - phos at 1-->repleted   - Mg  2.2   - K 3.6-->repleted    Yesterday's Events:   - night time events reviewed with nocturnal Intensivist during morning handoff at 0600   - required Levo at 50, epi at 10, and vaso at 0.03   - did not tolerate dobutamine to 7.5 and now back at 5   - Lasix 20mg IV x 2 with 800cc UOP overnight   - refractory shock-->150mg of methylene blue given   - stat ECHO with worsening EF-->pt in need of emergent Impella placement   - propofol stopped and Precedex started   - SAT-->following commands   - SR/ST 90-110s   - SBP 70-100s   - Tmax 98.8   - BMS in place-->minimal output   - UOP of 800cc with rich after lasix    - TFs started   - heparin drip held due to ? Hemoptysis   - Vent day #4   - CXR(reviewed): bilateral opacities seen again L>R, air bronchograms present   - FiO2 at 80% and PEEP of 12-->10   - Heparin drip held    - ASA/high intensity statin   - pepcid   - -320s-->insulin drip to start   - K 3.1   - Creat 1.32-->1.52-->1.47   - iCa 1.1   - lactate 4.7   - steroids   - day 2 of Unasyn/vanco    **0800:  Methylene blue 150mg IV given with improvement to vasopressor needs with reduction in levophed down to 30 and epi down to 5 over the next 60 minutes.  Stat repeat ECHO with worsening EF to 20%.  Discussion with cardiology as the bedside about the patient's shock status:  Likely distributive and cardiogenic.  Emergent Impella to be placed by cardiology.  Patient also undergoing emergent cardiac catheterization and will await updates from cardiology.      Review of Systems  Review of Systems   Unable to perform ROS: Intubated        Vital Signs for last 24 hours   Temp:  [36 °C (96.8 °F)-39.9 °C (103.8 °F)] 38.1 °C (100.6 °F)  Pulse:  [] 104  Resp:  [19-32] 32  BP: ()/(49-81) 86/72  SpO2:  [84 %-100 %] 100 %    Hemodynamic parameters for last 24 hours  CVP:  [11 MM HG-15 MM HG] 11 MM HG  PCWP:  [15 MM HG-23 MM HG] 21 MM HG  CO:  [3-5.1] 4  CI:  [1.6-2.8] 2.1    Respiratory Information for the last  24 hours  Vent Mode: APVCMV  Rate (breaths/min): 32  Vt Target (mL): 420  PEEP/CPAP: 14  MAP: 18  Control VTE (exp VT): 421    Physical Exam   Physical Exam  Vitals and nursing note reviewed.   Constitutional:       Appearance: He is obese. He is ill-appearing.   HENT:      Head: Normocephalic and atraumatic.      Right Ear: External ear normal.      Left Ear: External ear normal.      Nose: Nose normal. No rhinorrhea.      Mouth/Throat:      Mouth: Mucous membranes are moist.      Comments: ETT in place  Eyes:      General: No scleral icterus.     Conjunctiva/sclera: Conjunctivae normal.      Pupils: Pupils are equal, round, and reactive to light.      Comments: Pupils 2mm and reactive   Neck:      Comments: Right IJ TLC, right IJ [place]-Kamla catheter   Cardiovascular:      Rate and Rhythm: Regular rhythm. Tachycardia present.      Pulses:           Radial pulses are 0 on the right side and 0 on the left side.        Femoral pulses are 2+ on the right side.       Dorsalis pedis pulses are 0 on the right side and 0 on the left side.      Heart sounds: Normal heart sounds. No murmur heard.  Pulmonary:      Breath sounds: No wheezing.      Comments: Breathing comfortably on the vent, clear anteriorly, diminished bases, no crackles  Chest:      Chest wall: No tenderness.   Abdominal:      General: There is no distension.      Palpations: Abdomen is soft.      Tenderness: There is no abdominal tenderness. There is no guarding or rebound.   Genitourinary:     Comments: Root in place  Musculoskeletal:         General: Normal range of motion.      Cervical back: Normal range of motion and neck supple.      Right lower leg: No edema.      Left lower leg: No edema.      Comments: Left Impella in place   Lymphadenopathy:      Cervical: No cervical adenopathy.   Skin:     General: Skin is dry.      Capillary Refill: Capillary refill takes more than 3 seconds.      Findings: No rash.      Comments: Cool distal extremities with  mottling   Neurological:      Sensory: No sensory deficit.      Motor: No weakness.      Comments: Tracking and following commands, moving all x 4   Psychiatric:      Comments: Unable to assess         Medications  Current Facility-Administered Medications   Medication Dose Route Frequency Provider Last Rate Last Admin   • potassium chloride in water (KCL) ivpb **Administer in ICU only** 20 mEq  20 mEq Intravenous Once Anabel Castillo M.D. 50 mL/hr at 02/17/22 0629 20 mEq at 02/17/22 0629   • hydrocortisone sodium succinate PF (Solu-CORTEF) 100 MG injection 100 mg  100 mg Intravenous Q8HRS Anabel Castillo M.D.   100 mg at 02/17/22 0520   • aspirin (ASA) chewable tab 81 mg  81 mg Enteral Tube DAILY Arturo Khan M.D.   81 mg at 02/17/22 0519   • prasugrel (EFFIENT) tablet 10 mg  10 mg Enteral Tube DAILY Arturo Khan M.D.       • heparin infusion 25,000 Units in 500 mL 0.45% NACL  0-1,000 Units/hr Intravenous Continuous Arturo Khan M.D. 6.1 mL/hr at 02/17/22 0600 305 Units/hr at 02/17/22 0600    And   • heparin 25 units/mL in 500mL D5W infusion (for use with IMPELLA Pump)   Units/hr Other Continuous Arturo Khan M.D. 15.8 mL/hr at 02/17/22 0600 395 Units/hr at 02/17/22 0600   • insulin regular (HumuLIN R,NovoLIN R) injection  0-14 Units Intravenous Once Anita Aguilera M.D.       • insulin regular (HumuLIN R) 100 Units in  mL Infusion  0-29 Units/hr Intravenous Continuous Anita Aguilera M.D. 17 mL/hr at 02/17/22 0626 17 Units/hr at 02/17/22 0626   • dextrose 10 % BOLUS 125-250 mL  125-250 mL Intravenous PRN Anita Aguilera M.D.       • fentaNYL (SUBLIMAZE) 50 mcg/mL in 50mL   Intravenous Continuous Anita Aguilera M.D. 5 mL/hr at 02/17/22 0306 250 mcg/hr at 02/17/22 0306   • fentaNYL (SUBLIMAZE) injection 25 mcg  25 mcg Intravenous Q HOUR PRN Anita Aguilera M.D.        Or   • fentaNYL (SUBLIMAZE) injection 50 mcg  50 mcg Intravenous Q HOUR PRN nAita Aguilera M.D.        Or    • fentaNYL (SUBLIMAZE) injection 100 mcg  100 mcg Intravenous Q HOUR PRN Anita Aguilera M.D.   100 mcg at 02/17/22 0245   • dexmedetomidine (Precedex) 400 mcg/100mL D5W premix infusion  0-1.5 mcg/kg/hr Intravenous Continuous Anita Aguilera M.D. 20.4 mL/hr at 02/17/22 0307 1.1 mcg/kg/hr at 02/17/22 0307   • acetaminophen (Tylenol) tablet 650 mg  650 mg Oral Q6HRS PRN Anita Aguilera M.D.   650 mg at 02/16/22 1636   • EPINEPHrine (Adrenalin) infusion 16 mg/500 mL (premix)  0-10 mcg/min Intravenous Continuous Anita Aguilera M.D. 9.4 mL/hr at 02/17/22 0019 5 mcg/min at 02/17/22 0019   • K+ Scale: Goal of 4.5  1 Each Intravenous Q6HRS Juan Pablo Garcia M.D.   1 Each at 02/17/22 0600   • amiodarone (CORDARONE) 450 mg in dextrose 5% 250 mL Infusion  1 mg/min Intravenous Continuous Anita Aguilera M.D. 33 mL/hr at 02/17/22 0639 1 mg/min at 02/17/22 0639   • furosemide (LASIX) injection 20 mg  20 mg Intravenous Q DAY Anita Aguilera M.D.   20 mg at 02/17/22 0520   • Pharmacy Consult: Enteral tube insertion - review meds/change route/product selection  1 Each Other PHARMACY TO DOSE Anita Aguilera M.D.       • vasopressin (VASOSTRICT) 20 Units in  mL Infusion  0.03 Units/min Intravenous Continuous Anita Aguilera M.D. 9 mL/hr at 02/16/22 2326 0.03 Units/min at 02/16/22 2326   • DOBUTamine (DOBUTREX) 1 mg/mL premix infusion  5 mcg/kg/min Intravenous Continuous Anabel Castillo M.D. 22.3 mL/hr at 02/17/22 0439 5 mcg/kg/min at 02/17/22 0439   • norepinephrine (Levophed) infusion 32 mg/500 mL (premix)  0-50 mcg/min Intravenous Continuous Anita Aguilera M.D. 28.1 mL/hr at 02/17/22 0350 30 mcg/min at 02/17/22 0350   • ampicillin/sulbactam (UNASYN) 3 g in  mL IVPB  3 g Intravenous Q6HRS Anita Aguilera M.D.   Stopped at 02/17/22 0549   • Respiratory Therapy Consult   Nebulization Continuous RT Juan Pablo Garcia M.D.       • famotidine (PEPCID) tablet 20 mg  20 mg Enteral Tube Q12HRS Juan Pablo LOJA  RUDDY Garcia   20 mg at 02/17/22 0519    Or   • famotidine (PEPCID) injection 20 mg  20 mg Intravenous Q12HRS Juan Pablo Garcia M.D.   20 mg at 02/16/22 0556   • senna-docusate (PERICOLACE or SENOKOT S) 8.6-50 MG per tablet 2 Tablet  2 Tablet Enteral Tube BID Juan Pablo Garcia M.D.   2 Tablet at 02/17/22 0519    And   • polyethylene glycol/lytes (MIRALAX) PACKET 1 Packet  1 Packet Enteral Tube QDAY PRN Juna Pablo Garcia M.D.        And   • magnesium hydroxide (MILK OF MAGNESIA) suspension 30 mL  30 mL Enteral Tube QDAY PRN Juan Pablo Garcia M.D.        And   • bisacodyl (DULCOLAX) suppository 10 mg  10 mg Rectal QDAY PRN Juan Pablo Garcia M.D.       • MD Alert...ICU Electrolyte Replacement per Pharmacy   Other PHARMACY TO DOSE Juan Pablo Garcia M.D.       • lidocaine (XYLOCAINE) 1 % injection 2 mL  2 mL Tracheal Tube Q30 MIN PRN Juan Pablo Garcia M.D.       • ipratropium-albuterol (DUONEB) nebulizer solution  3 mL Nebulization Q2HRS PRN (RT) Juan Pablo Garcia M.D.       • atorvastatin (LIPITOR) tablet 80 mg  80 mg Enteral Tube Q EVENING Christopher Keller M.D.   80 mg at 02/16/22 1816   • LORazepam (ATIVAN) injection 2-4 mg  2-4 mg Intravenous Q2 MIN PRN Anita Aguilera M.D.   2 mg at 02/14/22 0844       Fluids    Intake/Output Summary (Last 24 hours) at 2/17/2022 0644  Last data filed at 2/17/2022 0600  Gross per 24 hour   Intake 5201.79 ml   Output 1934 ml   Net 3267.79 ml       Laboratory  Recent Labs     02/16/22  0904 02/16/22  1322 02/17/22  0317   ISTATAPH 7.408 7.422 7.426   ISTATAPCO2 30.1 32.4 35.0   ISTATAPO2 57* 65 62*   ISTATATCO2 20 22 24   [id number] 90* 93 92*   ISTATARTHCO3 19.0 21.1 23.0   ISTATARTBE -5* -2 -1   ISTATTEMP 36.9 C 37.4 C 36.3 C   ISTATFIO2 70 70 100   ISTATSPEC Arterial Arterial Arterial   ISTATAPHTC 7.410 7.416 7.436   [id number] 57* 67 59*         Recent Labs     02/15/22  0550 02/15/22  2220 02/16/22  0335 02/16/22  0551 02/16/22  1315 02/16/22  1749 02/16/22  2306 02/17/22  0500    SODIUM 139   < > 138 140 137  --   --  136   POTASSIUM 4.4   < > 3.2* 3.1* 3.4* 3.5* 3.6 3.6   CHLORIDE 105   < > 103 103 104  --   --  104   CO2 18*   < > 18* 20 21  --   --  22   BUN 33*   < > 42* 45* 37*  --   --  32*   CREATININE 1.32   < > 1.40 1.47* 1.00  --   --  0.71   MAGNESIUM 2.2   < > 2.2 2.1 2.3  --   --  2.2   PHOSPHORUS 4.8*  --  3.5  --   --   --   --  1.0*   CALCIUM 8.2*   < > 7.0* 8.4* 7.6*  --   --  7.3*    < > = values in this interval not displayed.     Recent Labs     02/16/22 0335 02/16/22 0551 02/16/22 1315 02/17/22  0500   ALTSGPT 74*  --  93* 101*   ASTSGOT 173*  --  349* 379*   ALKPHOSPHAT 46  --  46 51   TBILIRUBIN 0.6  --  0.8 1.3   GLUCOSE 273* 328* 325* 293*     Recent Labs     02/15/22  2220 02/16/22  0335 02/16/22  1315 02/17/22  0500   WBC 16.8* 13.3* 8.2 12.8*   NEUTSPOLYS 61.80  --   --   --    LYMPHOCYTES 7.80*  --   --   --    MONOCYTES 7.80  --   --   --    EOSINOPHILS 0.90  --   --   --    BASOPHILS 0.00  --   --   --    ASTSGOT  --  173* 349* 379*   ALTSGPT  --  74* 93* 101*   ALKPHOSPHAT  --  46 46 51   TBILIRUBIN  --  0.6 0.8 1.3     Recent Labs     02/16/22 0335 02/16/22 0551 02/16/22 1315 02/17/22  0500   RBC 4.17*  --  3.79* 2.99*   HEMOGLOBIN 13.1*  --  11.8* 9.7*   HEMATOCRIT 38.5*  --  34.7* 27.6*   PLATELETCT 142*  --  131* 120*   PROTHROMBTM  --  19.6* 19.2*  --    APTT  --  90.0*  --   --    INR  --  1.71* 1.67*  --        Imaging  ECHO 2/16:  Severely reduced left ventricular systolic function. The left   ventricular ejection fraction is visually estimated to be 20%.  Global hypokinesis with apical akinesis. No apical thrombus seen.  No pericardial effusion.    ECHO 2/14:  Severely reduced left ventricular systolic function. The left   ventricular ejection fraction is visually estimated to be 30%. Global   hypokinesis with regional variation and apical akinesis  The right ventricle is normal in size and systolic function.  No significant valvular  abnormalities.  No prior study is available for comparison. Cardiology team following   the patient, aware of results.    Assessment/Plan  * Cardiac arrest ([facility])  Assessment & Plan  Out of hospital cardiac arrest due to NSTEMI  Cont all supportive therapy  Monitor for arrythmia  Mg goal > 2, K goal > 4  Cardiology following  S/p normothermia protocols    TAYLOR (acute kidney injury) ([facility])  Assessment & Plan  Suspect due to shock state and hypoperfusion  Improved with Impella  Cont to monitor UOP/creat  Avoid nephrotoxins  Lasix as needed    Cardiogenic shock ([facility])- (present on admission)  Assessment & Plan  Present after cardiac arrest and due to NSTEMI and ongoing ischemia  [place]-Kamla placed on 2/15  Cont dobutamine at 5  Cont levophed and epinephrine for MAP goals > 65  Cardiology following  Emergent Impella placed on 2/16-->pt with EF<10% and cardiac power output at 0.6 juárez.  Due to such poor LV function patient will need emergent transfer to higher level of care where there is ECMO/transplant capabilities.  Referrals made and family prefers [facility] (Dr. Ronak Marquez accepting).  Patient is absolutely too high risk to be flown with multiple vasopressors, antiarrhythmics, ionotropes, APRV vent setting, and Impella.  Discussed case with cardiology, ECMO prn, and CTS-->will cannulate here and emergently transfer on ECMO to [facility].  Monitor UOP/lactates    Septic shock ([facility])  Assessment & Plan  This is Septic shock Not present on admission  SIRS criteria identified on my evaluation include: Tachycardia, with heart rate greater than 90 BPM, Tachypnea, with respirations greater than 20 per minute, Leukocytosis, with WBC greater than 12,000 and Bandemia, greater than 10% bands  Source is ?aspiration pneumonitis during cardiac arrest  Presentation includes: Severe sepsis present and initial Lactate level result is >= 4 mmol/L. Unable to given fluid boluses due to severe depressed LV function from ischemic heart disease and NSTEMI    Despite appropriate fluid resuscitation with crystalloid given per sepsis guidelines, the patient remains hypotensive with systolic blood pressure less than 90 or MAP less than 65  Hemodynamic support with additional fluids and IV vasopressors as needed to maintain a SBP of 90 or MAP of 65  IV antibiotics as appropriate for source of sepsis  Reassessment: I have reassessed the patient's hemodynamic status    Trach aspirate with MSSA from 2/16, BCs negative  Trending lactate, UOP  Vasopressors for MAP goals > 65  Continue Unasyn  MRSA negative, Vanco stopped  Stop stress dose steroids, cortisol at 22      Transaminitis- (present on admission)  Assessment & Plan  Continue to improve  Likely related to shock liver from cardiac arrest    Hypomagnesemia- (present on admission)  Assessment & Plan  Replete to goal > 2    Acute encephalopathy- (present on admission)  Assessment & Plan  Resolving and following all commands  No further seizure activity  EEG with metabolic encephalopathy    NSTEMI (non-ST elevated myocardial infarction) (HCC)- (present on admission)  Assessment & Plan  CAD hx  Heparin drip ongoing  Cont ASA/Prasugrel, high intensity statin  Cardiology following  LHC on 2/16 with successful PCI of the left main bifurcation into LAD and circumflex with 2 DARYL, residual occlusion of the RCA and moderate disease in the distal circumflex, and severe PAD of the right external iliac artery.      Shock (HCC)- (present on admission)  Assessment & Plan  Concern for both distributive (septic) and cardiogenic shock-->primarily cardiogenic with LV Ef at <10%  [place]-Kamla catheter placed and noted both low CI and depressed SVR initially  Impella placed on 2/16 for cardiogenic shock with CO at 3.7, no LV function noted and will need transfer to an ECMO/transplant center.  Referrals made and family with preference to [facility].  Cont levophed and epineprhine for MAP goals > 65  Dobutamine at 5  S/p methylene blue for refractory  shock on 2/16  Pan cultured-->cont Unasyn  Stop stress dose steroids since cortisol 22  Follow lactates, UOP      Lactic acidosis  Assessment & Plan  Due to cardiac arrest  Worsening due to shock from sepsis and decompensated cardiogenic shock.    Type 2 diabetes mellitus (HCC)  Assessment & Plan  BG goals 140-180s-->improved control  cont insulin drip  Hourly BG    Acute respiratory failure with hypoxia (HCC)  Assessment & Plan  Intubated on 2/13  Cont full vent support  RT/O2 protocols  Vent bundle protocols  I am actively adjusting vent settings based on ABGs  Lung protective ventilation strategies-->changed to APRV with improvement of pO2 and stable pCO2  RASS of - 4 to 5 while on Impella and awaiting ECMO cannulation  Lasix as needed       VTE:  Heparin  Ulcer: H2 Antagonist  Lines: Central Line  Ongoing indication addressed and Root Catheter  Ongoing indication addressed    I have performed a physical exam and reviewed and updated ROS and Plan today (2/17/2022). In review of yesterday's note (2/16/2022), there are no changes except as documented above.     Discussed patient condition and risk of morbidity and/or mortality with RN, RT, Pharmacy, Charge nurse / hot rounds, Patient and cardiology    The patient remains critically ill.  I have assessed and reassessed the respiratory status and made ventilator adjustments based upon arterial blood gas analysis, ventilator waveforms and airway mechanics.  I have assessed and reassessed the blood pressure, hemodynamics, cardiovascular status. This patient remains at high risk for worsening cardiopulmonary dysfunction and death without the above critical care interventions.    Critical care time = 120 minutes in directly providing and coordinating critical care and extensive data review.  No time overlap and excludes procedures.

## 2022-02-17 NOTE — DISCHARGE PLANNING
Care Transition Team Discharge Planning    Anticipated Discharge Disposition: d/c to [facility] hospital once [facility] has provided INs AUTH for acute to acute transfer and there is an open bed    Action: Lsw received copy of pt's INs and picture ID.    Lsw scanned and emailed to [facility] w/ request they please add to media tab, etc.    SLw sent back to speak w/ [facility] to f/u regarding medical clearance, etc.    LSw advised pt to go to open bed in acute [place] hospital.    Lsw called [facility] and they are working on this. They are now aware the INS AUTH is being worked on d/t SW efforts.      Barriers to Discharge: medical clearance, ins auth, transport     Plan: Lsw will continue to follow, and assist w/ d/c planning.

## 2022-02-17 NOTE — ANESTHESIA PREPROCEDURE EVALUATION
Case: [id number] Date/Time: 02/17/22 1329    Procedure: INSERTION, CANNULA, FOR ECMO, ADULT (Groin)    Anesthesia type: General    Pre-op diagnosis: Status post arrest, ECMO    Location: [place] [address] / SURGERY [facility]    Surgeons: Oswaldo Milton D.O.          Relevant Problems   CARDIAC   (positive) Cardiac arrest (HCC)   (positive) NSTEMI (non-ST elevated myocardial infarction) (HCC)         (positive) TAYLOR (acute kidney injury) (HCC)      ENDO   (positive) Type 2 diabetes mellitus (HCC)       Physical Exam    Airway   TM distance: >3 FB       Cardiovascular   Rhythm: regular     Dental    Pulmonary   (+) rhonchi     Abdominal    Neurological - sedated/unconcious         Other findings: Intubated, impella in place            Anesthesia Plan    ASA 4- EMERGENT   ASA physical status 4 criteria: MI - recent (< 3 months), respiratory failure, ongoing cardiac ischemia or unstable angina and severe reduction of ejection fractionsASA physical status emergent criteria: sepsis and compromised vital organ, limb or tissue    Plan - general       Airway plan will be ETT  SHONDA Planned      Plan Factors:   Patient was previously instructed to abstain from smoking on day of procedure.  Patient did not smoke on day of procedure.      Induction: intravenous          Informed Consent:    Anesthetic plan and risks discussed with patient.    Use of blood products discussed with: patient whom.

## 2022-02-17 NOTE — DISCHARGE SUMMARY
Discharge Summary    CHIEF COMPLAINT ON ADMISSION  Chief Complaint   Patient presents with   • Unresponsive     ROSC upon EMS arrival.        Reason for Admission  Cardiac arrest (HCC)     CODE STATUS  Full Code    HPI & HOSPITAL COURSE   Mr. Dimas is a 46 year old male with the past medical history of CAD and diabetes [place]er CPR and defibrillated out of VF/VT.  The patient was intubated and brought to [facility].  Cardiology is following and started on heparin drip and aspirin.  2/14 - VD#2, ?seizure-->EEG negative, troponin to 1987, low UOP, low dose levophed  2/15 - VD#3, SAT-->intermittently following all commands, worsening shock-->a-line and Primrose placed, Levo, vaso, and dobutamine.  Unasyn/vanco started with pan cultures  2/16 - VD#4, following all commands, s/p methylene blue for refractory shock, EF worsened to  10-20%-->cards took to cath lab with Impella placed, [facility] with successful PCI of the left main bifurcation into LAD and circumflex with 2 DARYL, residual occlusion of the RCA and moderate disease in the distal circumflex, and severe PAD of the right external iliac artery, ongoing shock  2/17 - VD#5, ongoing shock without improvement to EF and cardiogenic shock.  Referrals made to ECMO/transplant facility-->accepted to [facility] via Dr. Severo Smith, critical care.  ECMO prn consulted along with cardiothoracic and patient will be cannulated at [facility] OR time at 1500 and then emergent transfer.    Working Problem List:  * Cardiac arrest (HCC)  Assessment & Plan  Out of hospital cardiac arrest due to NSTEMI  Cont all supportive therapy  Monitor for arrythmia  Mg goal > 2, K goal > 4  Cardiology following  S/p normothermia protocols     TAYLOR (acute kidney injury) ([facility])  Assessment & Plan  Suspect due to shock state and hypoperfusion  Improved with Impella  Cont to monitor UOP/creat  Avoid nephrotoxins  Lasix as needed     Cardiogenic shock ([facility])- (present on  admission)  Assessment & Plan  Present after cardiac arrest and due to NSTEMI and ongoing ischemia  [place]-Kamla placed on 2/15  Cont dobutamine at 5  Cont levophed and epinephrine for MAP goals > 65  Cardiology following  Emergent Impella placed on 2/16-->pt with EF<10% and cardiac power output at 0.6 juárez.  Due to such poor LV function patient will need emergent transfer to higher level of care where there is ECMO/transplant capabilities.  Referrals made and family prefers [facility] (Dr. Ronak Marquez accepting).  Patient is absolutely too high risk to be flown with multiple vasopressors, antiarrhythmics, ionotropes, APRV vent setting, and Impella.  Discussed case with cardiology, ECMO prn, and CTS-->will cannulate here and emergently transfer on ECMO to [facility].  Monitor UOP/lactates     Septic shock (HCC)  Assessment & Plan  This is Septic shock Not present on admission  SIRS criteria identified on my evaluation include: Tachycardia, with heart rate greater than 90 BPM, Tachypnea, with respirations greater than 20 per minute, Leukocytosis, with WBC greater than 12,000 and Bandemia, greater than 10% bands  Source is ?aspiration pneumonitis during cardiac arrest  Presentation includes: Severe sepsis present and initial Lactate level result is >= 4 mmol/L. Unable to given fluid boluses due to severe depressed LV function from ischemic heart disease and NSTEMI   Despite appropriate fluid resuscitation with crystalloid given per sepsis guidelines, the patient remains hypotensive with systolic blood pressure less than 90 or MAP less than 65  Hemodynamic support with additional fluids and IV vasopressors as needed to maintain a SBP of 90 or MAP of 65  IV antibiotics as appropriate for source of sepsis  Reassessment: I have reassessed the patient's hemodynamic status     Trach aspirate with MSSA from 2/16, BCs negative  Trending lactate, UOP  Vasopressors for MAP goals > 65  Continue Unasyn  MRSA negative, Vanco stopped  Stop stress  dose steroids, cortisol at 22        Transaminitis- (present on admission)  Assessment & Plan  Continue to improve  Likely related to shock liver from cardiac arrest     Hypomagnesemia- (present on admission)  Assessment & Plan  Replete to goal > 2     Acute encephalopathy- (present on admission)  Assessment & Plan  Resolving and following all commands  No further seizure activity  EEG with metabolic encephalopathy     NSTEMI (non-ST elevated myocardial infarction) (HCC)- (present on admission)  Assessment & Plan  CAD hx  Heparin drip ongoing  Cont ASA/Prasugrel, high intensity statin  Cardiology following  LHC on 2/16 with successful PCI of the left main bifurcation into LAD and circumflex with 2 DARYL, residual occlusion of the RCA and moderate disease in the distal circumflex, and severe PAD of the right external iliac artery.        Shock ([facility])- (present on admission)  Assessment & Plan  Concern for both distributive (septic) and cardiogenic shock-->primarily cardiogenic with LV Ef at <10%  [place]-Kamla catheter placed and noted both low CI and depressed SVR initially  Impella placed on 2/16 for cardiogenic shock with CO at 3.7, no LV function noted and will need transfer to an ECMO/transplant center.  Referrals made and family with preference to [facility].  Cont levophed and epineprhine for MAP goals > 65  Dobutamine at 5  S/p methylene blue for refractory shock on 2/16  Pan cultured-->cont Unasyn  Stop stress dose steroids since cortisol 22  Follow lactates, UOP        Lactic acidosis  Assessment & Plan  Due to cardiac arrest  Worsening due to shock from sepsis and decompensated cardiogenic shock.     Type 2 diabetes mellitus (HCC)  Assessment & Plan  BG goals 140-180s-->improved control  cont insulin drip  Hourly BG     Acute respiratory failure with hypoxia ([facility])  Assessment & Plan  Intubated on 2/13  Cont full vent support  RT/O2 protocols  Vent bundle protocols  I am actively adjusting vent settings based on  ABGs  Lung protective ventilation strategies-->changed to APRV with improvement of pO2 and stable pCO2  RASS of - 4 to 5 while on Impella and awaiting ECMO cannulation  Lasix as needed           Therefore, he is being transferred in guarded condition to [facility], accepting physician Dr. Prince Smith.    The patient met 2-midnight criteria for an inpatient stay at the time of discharge.      FOLLOW UP ITEMS POST DISCHARGE  n/a    DISCHARGE DIAGNOSES  Principal Problem:    Cardiac arrest (HCC) POA: Unknown  Active Problems:    Shock ([facility]) POA: Yes    NSTEMI (non-ST elevated myocardial infarction) ([facility]) POA: Yes    Acute encephalopathy POA: Yes    Hypomagnesemia POA: Yes    Transaminitis POA: Yes    Septic shock ([facility]) POA: No    Cardiogenic shock ([facility]) POA: Yes    TAYLOR (acute kidney injury) ([facility]) POA: No    Acute respiratory failure with hypoxia ([facility]) POA: Unknown    Type 2 diabetes mellitus ([facility]) POA: Unknown    Lactic acidosis POA: Unknown  Resolved Problems:    * No resolved hospital problems. *      FOLLOW UP  No future appointments.  [facility]  [address]  [address]  [facility] [id number] [phone]  Call  Your doctor has referred you for Cardiac Rehab which is important in your recovery. Please call to make an appointment or speak with your local doctor for programs in your area.      MEDICATIONS ON DISCHARGE  Continuous Drips:  1.  Fentanyl 300mcg/hr  2.  Versed 2mg /hr  3.  Insulin 12 units an hour for blood glucose goals of 140-180  4.  Norepinephrine 30 mcg/min   5.  Epinephrine 5 mcg/min   6.  Dobutamine 5 mcg/kg/min  7.  Heparin infusion  8.  Amiodarone 1mg/min    Scheduled Medications:  1.  Unasyn 3 g every 6 hours last dose 2/20/2022 at 1200  2.  Aspirin 81 mg enteral tube daily  3.  Atorvastatin 80 mg enteral tube every evening  4.  Pepcid 20 mg every 12 hours  5.  Lasix 20 mg IV daily  6.  K scale  7.  Prasugrel 10 mg enteral tube  daily    Allergies  No Known Allergies    DIET  Orders Placed This Encounter   Procedures   • Diet: Diet Tube Feed; Formula: Novasource Renal (On/off prop); Goal Rate (mL/Hour): 40; Duration: 24 HR     Standing Status:   Standing     Number of Occurrences:   1     Order Specific Question:   Diet     Answer:   Diet Tube Feed [35]     Order Specific Question:   Formula:     Answer:   Novasource Renal     Comments:   On/off prop     Order Specific Question:   Goal Rate (mL/Hour)     Answer:   40     Order Specific Question:   Route     Answer:   Cortrak     Order Specific Question:   Duration     Answer:   24 HR       ACTIVITY  As tolerated.  Weight bearing as tolerated    LINES, DRAINS, AND WOUNDS  This is an automated list. Peripheral IVs will be removed prior to discharge.  Arterial Line 02/15/22 Right Brachial (Active)   Site Assessment Clean;Dry;Intact 02/16/22 2000   Line Status Pulsatile blood flow 02/16/22 2000   Art Line Waveform Square wave test performed;Appropriate 02/16/22 2000   Line Care Calibrated;Connections replaced and tightened;Flushed per protocol;Leveled;Zeroed 02/16/22 2000   Color/Movement/Sensation Capillary refill greater than 3 sec;Cool fingers/toes 02/16/22 2000   Perfusion Check Right;Upper 02/16/22 2000   Dressing Type Biopatch;Transparent 02/16/22 2000   Dressing Status Clean;Dry;Intact 02/16/22 2000   Dressing Intervention N/A 02/16/22 2000   Dressing Change Due 02/19/22 02/16/22 2000   Date Primary Tubing Changed 02/15/22 02/15/22 2000   Date IV Connector(s) Changed 02/15/22 02/15/22 2000   NEXT Primary Tubing Change  02/19/22 02/15/22 2000   Cuff Pressure Correlates Not Applicable 02/16/22 2000   Sutures Removed Intact Yes 02/16/22 0800       Peripheral IV 02/13/22 20 G Right Antecubital (Active)   Site Assessment Clean;Dry;Intact 02/16/22 2000   Dressing Type Transparent 02/16/22 2000   Line Status Infusing 02/16/22 2000   Dressing Status Clean;Dry;Intact 02/16/22 2000   Dressing  Intervention N/A 02/16/22 2000   Dressing Change Due 02/19/22 02/16/22 2000   Date Primary Tubing Changed 02/13/22 02/14/22 0400   NEXT Primary Tubing Change  02/15/22 02/14/22 0400   Infiltration Grading ([facility], [facility]) 0 02/16/22 2000   Phlebitis Scale ([facility] Only) 0 02/16/22 2000       Peripheral IV 02/13/22 20 G Left Antecubital (Active)   Site Assessment Clean;Dry;Intact 02/16/22 2000   Dressing Type Transparent 02/16/22 2000   Line Status Saline locked;Scrubbed the hub prior to access;Flushed 02/16/22 2000   Dressing Status Clean;Dry;Intact 02/16/22 2000   Dressing Intervention N/A 02/16/22 2000   Dressing Change Due 02/19/22 02/16/22 2000   Infiltration Grading ([facility]) 0 02/16/22 2000   Phlebitis Scale ([facility] Only) 0 02/16/22 2000       Peripheral IV 02/13/22 20 G Right Hand (Active)   Site Assessment Clean;Dry;Intact 02/16/22 2000   Dressing Type Transparent 02/16/22 2000   Line Status Infusing 02/16/22 2000   Dressing Status Clean;Dry;Intact 02/16/22 2000   Dressing Intervention N/A 02/16/22 2000   Dressing Change Due 02/19/22 02/16/22 2000   Infiltration Grading ([facility]) 0 02/16/22 2000   Phlebitis Scale ([facility] Only) 0 02/16/22 2000       CVC Triple Lumen 02/13/22 Right Internal jugular (Active)   Site Assessment Clean;Dry;Intact 02/16/22 2000   Lumen 1 Status Infusing 02/16/22 2000   Lumen 3 Status Infusing 02/16/22 2000   Lumen 2 Status Infusing 02/16/22 2000   Dressing Type Biopatch;Occlusive 02/16/22 2000   Dressing Status Clean;Dry;Intact 02/16/22 2000   Dressing Intervention N/A 02/16/22 2000   Dressing Change Due 02/19/22 02/16/22 2000   Date Primary Tubing Changed 02/13/22 02/14/22 2000   Date IV Connector(s) Changed 02/19/22 02/15/22 0800   NEXT Primary Tubing Change  02/15/22 02/14/22 2000   NEXT IV Connector(s) Change 02/19/22 02/15/22 2000   Waveform Not Applicable 02/16/22 2000   Line Calibrated Not Applicable 02/16/22 2000   Line Necessity Assessed Vasopressor Therapy  02/16/22  0800       VAD Abiomed (Active)   Site Location Left groin 02/17/22 0800   Site Assessment Dry;Intact 02/17/22 0800   Leg Immobilizer in Use Yes 02/17/22 0800   Dressing Status Clean;Dry;Intact 02/17/22 0800   Dressing Intervention N/A 02/16/22 2000   Power Source Red plug 02/17/22 0800   Mode Standard configuration 02/17/22 0800       Venous Sheath Other (Comment) Right Internal jugular (Active)   Specific Qualities [place]-Kamla in place;Attached to CVP monitor;Intermittent infusions 02/17/22 0800   Site Assessment Clean;Dry;Intact 02/17/22 0800   Line Status Intact and in place 02/17/22 0800   Dressing Occlusive 02/17/22 0800   Dressing Status Clean;Dry;Intact 02/17/22 0800   Dressing Intervention N/A 02/16/22 2000   Dressing Change Due 02/19/22 02/17/22 0800   Color/Movement/Sensation Capillary refill greater than 3 sec;Cool fingers/toes 02/17/22 0800     Rectal Tube (Active)   Skin Care Area cleansed;Protective Barrier Applied 02/17/22 0800   Skin Integrity Intact 02/17/22 0800   Flushed Per Protocol Yes 02/17/22 0800   Rectal Tube Output 20 mL 02/17/22 0800       Urethral Catheter (Active)   Site Assessment Clean;Skin intact 02/17/22 1000   Collection Container Standard drainage bag 02/17/22 1000   Urinary Catheter Care Tamper Evident Seal in Place;Drainage Tube Extended;Drainage Bag Not Overfilled;Drainage Tubing Properly Secured 02/17/22 1000   Securement Method Securing device (Describe) 02/17/22 1000   Output (mL) 110 mL 02/17/22 1151     Airway ETT 7.5 (Active)   Site Assessment Clean 02/17/22 1044   Airway Tube Secured Commercial securing device 02/17/22 1044   Date Securing Device changed? 02/13/22 02/16/22 0800   Secured At  (cm) 25 02/17/22 1044   Tube Repositioned Left;Bite Block In  02/17/22 0900   Cuffless No 02/17/22 0642   Cuff Pressure cmH2O (R.C.) 24 02/17/22 0642   Suctioning Device Inline Catheter Replaced 02/16/22 1600      CVC Triple Lumen 02/13/22 Right Internal jugular (Active)   Site  Assessment Clean;Dry;Intact 02/16/22 2000   Lumen 1 Status Infusing 02/16/22 2000   Lumen 3 Status Infusing 02/16/22 2000   Lumen 2 Status Infusing 02/16/22 2000   Dressing Type Biopatch;Occlusive 02/16/22 2000   Dressing Status Clean;Dry;Intact 02/16/22 2000   Dressing Intervention N/A 02/16/22 2000   Dressing Change Due 02/19/22 02/16/22 2000   Date Primary Tubing Changed 02/13/22 02/14/22 2000   Date IV Connector(s) Changed 02/19/22 02/15/22 0800   NEXT Primary Tubing Change  02/15/22 02/14/22 2000   NEXT IV Connector(s) Change 02/19/22 02/15/22 2000   Waveform Not Applicable 02/16/22 2000   Line Calibrated Not Applicable 02/16/22 2000   Line Necessity Assessed Vasopressor Therapy  02/16/22 0800      Peripheral IV 02/13/22 20 G Right Antecubital (Active)   Site Assessment Clean;Dry;Intact 02/16/22 2000   Dressing Type Transparent 02/16/22 2000   Line Status Infusing 02/16/22 2000   Dressing Status Clean;Dry;Intact 02/16/22 2000   Dressing Intervention N/A 02/16/22 2000   Dressing Change Due 02/19/22 02/16/22 2000   Date Primary Tubing Changed 02/13/22 02/14/22 0400   NEXT Primary Tubing Change  02/15/22 02/14/22 0400   Infiltration Grading ([facility]) 0 02/16/22 2000   Phlebitis Scale ([facility] Only) 0 02/16/22 2000       Peripheral IV 02/13/22 20 G Left Antecubital (Active)   Site Assessment Clean;Dry;Intact 02/16/22 2000   Dressing Type Transparent 02/16/22 2000   Line Status Saline locked;Scrubbed the hub prior to access;Flushed 02/16/22 2000   Dressing Status Clean;Dry;Intact 02/16/22 2000   Dressing Intervention N/A 02/16/22 2000   Dressing Change Due 02/19/22 02/16/22 2000   Infiltration Grading ([facility], [facility]) 0 02/16/22 2000   Phlebitis Scale ([facility] Only) 0 02/16/22 2000       Peripheral IV 02/13/22 20 G Right Hand (Active)   Site Assessment Clean;Dry;Intact 02/16/22 2000   Dressing Type Transparent 02/16/22 2000   Line Status Infusing 02/16/22 2000   Dressing Status Clean;Dry;Intact 02/16/22 2000    Dressing Intervention N/A 02/16/22 2000   Dressing Change Due 02/19/22 02/16/22 2000   [facility] 0 02/16/22 2000   Phlebitis Scale ([facility] Only) 0 02/16/22 2000      Arterial Line 02/15/22 Right Brachial (Active)   Site Assessment Clean;Dry;Intact 02/16/22 2000   Line Status Pulsatile blood flow 02/16/22 2000   Art Line Waveform Square wave test performed;Appropriate 02/16/22 2000   Line Care Calibrated;Connections replaced and tightened;Flushed per protocol;Leveled;Zeroed 02/16/22 2000   Color/Movement/Sensation Capillary refill greater than 3 sec;Cool fingers/toes 02/16/22 2000   Perfusion Check Right;Upper 02/16/22 2000   Dressing Type Biopatch;Transparent 02/16/22 2000   Dressing Status Clean;Dry;Intact 02/16/22 2000   Dressing Intervention N/A 02/16/22 2000   Dressing Change Due 02/19/22 02/16/22 2000   Date Primary Tubing Changed 02/15/22 02/15/22 2000   Date IV Connector(s) Changed 02/15/22 02/15/22 2000   NEXT Primary Tubing Change  02/19/22 02/15/22 2000   Cuff Pressure Correlates Not Applicable 02/16/22 2000   Sutures Removed Intact Yes 02/16/22 0800         Urethral Catheter (Active)   Site Assessment Clean;Skin intact 02/17/22 1000   Collection Container Standard drainage bag 02/17/22 1000   Urinary Catheter Care Tamper Evident Seal in Place;Drainage Tube Extended;Drainage Bag Not Overfilled;Drainage Tubing Properly Secured 02/17/22 1000   Securement Method Securing device (Describe) 02/17/22 1000   Output (mL) 110 mL 02/17/22 1151        MENTAL STATUS ON TRANSFER  Heavily sedated with a RASS of -4 to -5 on Versed 2mg/hr and Fentanyl 300mcg/hr    **Patient wakes, nods appropriately and follows commands during spontaneous awakening trial        CONSULTATIONS  Cardiology, Dr. Chapin May  Cardiothoracic, Dr. Oswaldo Milton  Critical care medicine, Maria G Aguilera and Kirby Fernandez  ECMO prn    PROCEDURES  1) Impella placement on 2/16 with cardiology  2) [place]-Kamla catheter  placement on 2/15 to right IJ  3) right axillary arterial line on 2/15  4) right IJ TLC on 2/14    CARDIAC STUDIES/PROCEDURES:     CARDIAC CATHETERIZATION CONCLUSIONS by Arturo Scales (02/16/22)  1. NSTEMI with cardiogenic shock due to severe LM and three vessel CAD  2. Successful PCI of the left main bifurcation into LAD and circumflex using DK Crush technique, IVUS guidance, 2 DARYL; (4.0 x 26 mm [place] drug-eluting stent from the left main, into the circumflex, 3.5 x 12 mm [place] stent at the distal edge, 3.0 x 30 mm [place] drug-eluting stent from the left main into the LAD)  3. Cardiogenic shock, improved with Impella CP placement  4. Residual occlusion of the RCA and moderate disease in the distal circumflex.   5. Incidental note of severe PAD involving the right external iliac artery  (study result reviewed)     ECHOCARDIOGRAM CONCLUSIONS (02/17/22)  Impella device measuring 3.5 cm from aortic annulus to catheter tip.  (study result reviewed)     ECHOCARDIOGRAM CONCLUSIONS (02/16/22)  Compared to the images of the prior study 2-, there has been no change.  Severely reduced left ventricular systolic function.   The left ventricular ejection fraction is visually estimated to be 10%.   Impella device measuring 3.5 cm from aortic annulus to catheter tip.   (study result reviewed)     ECHOCARDIOGRAM CONCLUSIONS (02/16/22)  Compared to the images of the prior study 2-, there has been no change.  Severely reduced left ventricular systolic function.   The left ventricular ejection fraction is visually estimated to be 20%.  Global hypokinesis with apical akinesis. No apical thrombus seen.  No pericardial effusion.  (study result reviewed)     ECHOCARDIOGRAM CONCLUSIONS (02/14/22)  Severely reduced left ventricular systolic function.   The left ventricular ejection fraction is visually estimated to be 30%.   Global hypokinesis with regional variation and apical akinesis.  The right ventricle is normal in  size and systolic function.  No significant valvular abnormalities.  No prior study is available for comparison. Cardiology team following   the patient, aware of results.      LABORATORY  Lab Results   Component Value Date    SODIUM 136 02/17/2022    POTASSIUM 3.6 02/17/2022    CHLORIDE 104 02/17/2022    CO2 22 02/17/2022    GLUCOSE 293 (H) 02/17/2022    BUN 32 (H) 02/17/2022    CREATININE 0.71 02/17/2022        Lab Results   Component Value Date    WBC 12.8 (H) 02/17/2022    HEMOGLOBIN 9.7 (L) 02/17/2022    HEMATOCRIT 27.6 (L) 02/17/2022    PLATELETCT 120 (L) 02/17/2022        Total time of the discharge process exceeds 45 minutes.

## 2022-02-17 NOTE — CARE PLAN
The patient is Watcher - Medium risk of patient condition declining or worsening    Shift Goals  Clinical Goals: hemodynamic stability, HR control  Patient Goals: darío  Family Goals: darío    Progress made toward(s) clinical / shift goals:    Problem: Knowledge Deficit - Standard  Goal: Patient and family/care givers will demonstrate understanding of plan of care, disease process/condition, diagnostic tests and medications  Outcome: Progressing     Problem: Pain - Standard  Goal: Alleviation of pain or a reduction in pain to the patient’s comfort goal  Outcome: Progressing     Problem: Safety - Medical Restraint  Goal: Remains free of injury from restraints (Restraint for Interference with Medical Device)  Outcome: Progressing  Flowsheets (Taken 2/14/2022 7123 by Sneha Sotomayor R.N.)  Addressed this shift: Remains free of injury from restraints (restraint for interference with medical device):   Determine that other, less restrictive measures have been tried or would not be effective before applying the restraint   Evaluate the patient's condition at the time of restraint application   Inform patient/family regarding the reason for restraint   Every 2 hours: Monitor safety, psychosocial status, comfort, nutrition and hydration  Goal: Free from restraint(s) (Restraint for Interference with Medical Device)  Outcome: Progressing     Problem: Fall Risk  Goal: Patient will remain free from falls  Outcome: Progressing     Problem: Skin Integrity  Goal: Skin integrity is maintained or improved  Outcome: Progressing       Patient is not progressing towards the following goals:

## 2022-02-17 NOTE — PROGRESS NOTES
Daughter Alma would feel more comfortable if aunt, patients sister Angelica would be decision maker.     Alma [phone]  Angelica [phone]

## 2022-02-17 NOTE — PROGRESS NOTES
updated case management with insurance info, patient will hopefull transfer to [facility]

## 2022-02-17 NOTE — DISCHARGE PLANNING
Care Transition Team Discharge Planning    Anticipated Discharge Disposition: d/c acute to acute to [facility]     Action: Lsw received and acquired signatures on transfer back agreement: MD's signature,a dn 2 RN's signatures for COBRA as pt is unable to sign..     Lsw received voalte updaes from [facility]. Lsw began to complete COBRA, acquired Md's signature and NOK verbal permission from earlier to medical staff. Lsw placed 72 hours of notes in transfer packet including d/c summary.     Lsw provided packet to bedside RN indicating all that is left to place in envelope is the burned cd from the films room which was ordered stat by [facility] per their voalte message.    Barriers to Discharge: none    Plan: Pt to d/c via air EMS crew at 3pm to [facility] for Ech[facility] supports    Rn had the film room burned disc which Lsw placed in packet and provided to RN.

## 2022-02-17 NOTE — PROGRESS NOTES
Report called to Gela at [facility] [place], RM 2184

## 2022-02-17 NOTE — PROGRESS NOTES
Patient heart rate was -140 with frequent bursts of VT, Dr. Garcia updated and came to bedside. Epi drip decreased, amiodarone bolus and digoxin bolus given. Ectopy improved after interventions. If patient continues to have runs of VT will continue to titrate down on Epi drip.

## 2022-02-17 NOTE — PROGRESS NOTES
Assumed care of patient from NOC RN. VSS. All drips verified. Pulses not heard with Doppler, Kelly @ 65 cm, Impella at 89 cm, left groin hematoma stable. Call light in reach, bed in lowest position

## 2022-02-17 NOTE — THERAPY
Physical Therapy Contact Note    PT cardiac rehab consult received; awaiting hospital to hospital transfer with impella device; no role for acute PT evaluation; will sign off, please reconsult if pt dc delayed and care is provided here;    Prachi MARTINEZ, PT,  [phone]

## 2022-02-17 NOTE — PROGRESS NOTES
Weaning sedation. Pt turned, woke up, and continuously attempted to sit up in bed. Fentanyl IV given. Pt HR 170s afib RVR w/ hypotension SBP 60s. Pts pressors titrated up. Dr. Castillo called to bedside. Orders for amiodarone bolus. Pts HR ST 100s-120s post amio bolus.

## 2022-02-18 LAB
BACTERIA SPEC RESP CULT: ABNORMAL
BACTERIA SPEC RESP CULT: ABNORMAL
GLUCOSE BLD-MCNC: 146 MG/DL (ref 65–99)
GLUCOSE BLD-MCNC: 161 MG/DL (ref 65–99)
GLUCOSE BLD-MCNC: 163 MG/DL (ref 65–99)
GLUCOSE BLD-MCNC: 188 MG/DL (ref 65–99)
GRAM STN SPEC: ABNORMAL
SIGNIFICANT IND 70042: ABNORMAL
SITE SITE: ABNORMAL
SOURCE SOURCE: ABNORMAL

## 2022-02-18 NOTE — ANESTHESIA PROCEDURE NOTES
SHONDA    Date/Time: 2/17/2022 4:12 PM  Performed by: Rivera Leyva M.D.  Authorized by: Rivera Leyva M.D.     Start Time:2/17/2022 4:12 PM  Preanesthetic Checklist: patient identified, IV checked, site marked, risks and benefits discussed, surgical consent, monitors and equipment checked, pre-op evaluation and timeout performed    Indication for SHONDA: diagnostic   Patient Location: OR  Intubated: Yes  Bite Block: Yes  Heart Visualized: Yes  Insertion: atraumatic    **See FULL SHONDA report in patient's chart via CV Synapse**

## 2022-02-18 NOTE — ANESTHESIA POSTPROCEDURE EVALUATION
Patient: Mina Dimas    Procedure Summary     Date: 02/17/22 Room / Location: [facility] 02 / SURGERY [facility]    Anesthesia Start: 1555 Anesthesia Stop: 1750    Procedure: INSERTION, CANNULA, FOR ECMO, ADULT (Groin) Diagnosis: (Status post arrest, ECMO)    Surgeons: Oswaldo Milton D.O. Responsible Provider: Rivera Leyva M.D.    Anesthesia Type: general ASA Status: 4 - Emergent          Final Anesthesia Type: general  Last vitals  BP   Blood Pressure: (!) 86/72, Arterial BP: (!) 67/63    Temp   37 °C (98.6 °F)    Pulse   (!) 104   Resp   (!) 3    SpO2   (!) 61 %      Anesthesia Post Evaluation    Patient location during evaluation: PACU  Patient participation: complete - patient participated  Level of consciousness: awake and alert    Airway patency: patent  Anesthetic complications: no  Cardiovascular status: hemodynamically stable  Respiratory status: acceptable  Hydration status: euvolemic    PONV: none          No complications documented.

## 2022-02-18 NOTE — ANESTHESIA TIME REPORT
Anesthesia Start and Stop Event Times     Date Time Event    2/17/2022 1536 Ready for Procedure     1555 Anesthesia Start     1750 Anesthesia Stop        Responsible Staff  02/17/22    Name Role Begin End    Rivera Leyva M.D. Anesth 1555 1750        Preop Diagnosis (Free Text):  Pre-op Diagnosis     Status post arrest, ECMO        Preop Diagnosis (Codes):    Premium Reason  A. 3PM - 7AM    Comments:

## 2022-02-18 NOTE — PROGRESS NOTES
Working with my partner Dr Anita Aguilera and cardiology Dr Chapin May the decision was made to escalate Mechanical support for Mina.     Briefly Mina is 46y M with CAD, DM that suffered an OHCA VT/VF when visiting [place] for the [facility]. He continued to have significant shock requiring salvage therapy such as methylene blue to give us time to place a CP impella device and underwent PCI to his LAD. He still while on impella at P9 has refractory shock norepi 30, epi 5, dobutamine 5 ventilator support and swan numbers with significant pump failure,  0.5-0.6, Virgil of 2 and good RV function. This morning he has a flat line impella with no pulsatility on significant inotrope and vasopressors. The rest of his organs are being supported with good urine output and renal function, mild transamitis 300-400's, mid lactate of 2's and on ventilator support for his aspiration pneumonia w/ MSSA, neurologically he awakes and follows commands with SAT.     I reached out to Dr sIaac Graves [facility] advanced heart failure specialist who accepted patient. We were able to get authorization with patient insurance ([employer]) since there facility was full. Later with reaching out to additional facilities Dr Jimmy Graves [facility] also advance heart failure specialist and with the patient and his family being minutes away from this facility it was decided to move in this direction. I called Dagoberto Duffy w/ ECMO prn and his team was available. Discussed with cardiac surgery to make sure they would be available. I scheduled an OR time 1530. I had COD and blood ready for OR and to prime his ECMO circuit. I met ECMO prn team and brought them to the OR.     The cannula strategy was right femoral arterial cannula 16fr and left venous cannula 27fr with left femoral impella for LV vent. Prior to cannulation patient map was continued to be 47 with multiple suction alarms on impella. Under SHONDA his impella was in good position 3.44 cm. I escalate  pressors to norepi 60 epi 15 dobutamine 5 we drop P level multiple time to relieve suction and could only be on P5. Bicarb amps x2 was pushed and patient was placed on circuit. RPM 4000 Flow 4.2L ACT was checked 270 and map significantly stablized almost immediately and was able to wean off all norepinephrine and wean down epi to 5. We then adjusted the impella to make sure the LV was venting appropriately and looked most appropriate at P4. RV look well on support pre and post ECMO.     Patient was transferred to [facility] safely later from an update from Dagoberto Duffy.     Patient remains in critical condition from titration pressor, impella, emergent transfer and discussing with multiple consultants and bedside care in the operating room. Critical care time provided was 150 minutes in addition to Dr Aguilera 120 minutes on same day. This excludes all separate billable procedures.     Wily Fernandez MD  Critical Care Medicine

## 2022-02-18 NOTE — PROGRESS NOTES
[place] [facility]. All belongings sent with Sister Angelica prior to patient going to OR.

## 2022-02-18 NOTE — OR SURGEON
Operative Report    PreOp Diagnosis: Refactory Cardiogenic shock, s/p STEMI, MVCAD s/p PCI      PostOp Diagnosis: Same as baove      Procedure(s):  INSERTION, CANNULA, FOR ECMO, ADULT - Wound Class: Clean    Surgeon(s):  Oswaldo Milton D.O.    Anesthesiologist/Type of Anesthesia:  Anesthesiologist: Rivera Leyva M.D./General    Surgical Staff:  Assistant: JOSE RAUL Mercer  Circulator: Solitario Soriano R.N.  Perfusionist: Lino Olson  Scrub Person: Niharika Crane; Meera Ferrer    Specimens removed if any:  * No specimens in log *    Estimated Blood Loss: < 50 ml    Findings: Patient with hypotension and poor contractility with Impella support. Patient with improved hemodynamics and contractility with institution of ECMO coupled with LV venting with the Impella and inotropic support.  Patient remains critical    Complications: None immediate    Description of the procedure:    After consent was obtained, the patient was brought to the operating room.  He was transferred to the operating table.  He was prepped and draped in the usual sterile fashion.  Bony prominences were padded.  Joints placed in neutral position.  A timeout was performed.    Using ultrasound guidance, I cannulated the right common femoral artery using 18-gauge needle.  Guidewire was passed into the descending thoracic aorta as visualized on SHONDA.  This site was then serially dilated and an 18 [place] femoral arterial cannula was placed.  After de-airing and securing it to the patient, it was connected to the circuit.    Next, the left common femoral vein was cannulated under ultrasound guidance.  This was inhibited partially due to the Impella in place and a hematoma that developed after Impella placement in the Cath Lab.  Once I had confirmation of venous blood in my syringe, I then passed a J-wire into the right atrium as visualized on SHONDA.  This site was then serially dilated and a 20 [place] multistage venous cannula was advanced into  the right atrium under SHONDA guidance.  This was likewise de-aired, secured to the patient, and connected the circuit.  ECMO support was then initiated.    Once on full support, I used ultrasound guidance to then isolate the superficial femoral artery and cannulated with a micropuncture needle.  This was upsized to the 7 [place] distal perfusion limb.  Once I had good positioning of this limb, it was connected to the femoral arterial cannula via a metal metal connector.  In order to do that, flows were dropped, the [place] on the artery de-aired, and connected.  Once it was connected, flows were resumed at greater than 4 L of support.  The Impella was then weaned down to P4 support, with further weaning based off of SHONDA guidance.  Multiple 0 silk sutures were placed around each cannula in order to secure them in place.  At that point, sterile dressings were applied and I turned the care of the patient over to the ECMO transport team and the critical care doctor.        2/17/2022 5:09 PM Oswaldo Milton D.O.

## 2022-02-20 LAB — LV EJECT FRACT  99904: 15

## 2022-02-21 LAB
BACTERIA BLD CULT: NORMAL
BACTERIA BLD CULT: NORMAL
SIGNIFICANT IND 70042: NORMAL
SIGNIFICANT IND 70042: NORMAL
SITE SITE: NORMAL
SITE SITE: NORMAL
SOURCE SOURCE: NORMAL
SOURCE SOURCE: NORMAL

## 2022-03-11 NOTE — DOCUMENTATION QUERY
[facility]                                                                       Query Response Note      PATIENT:               LOIS COSTELLO  ACCT #:                  [id number]  MRN:                     [id number]  :                      1975  ADMIT DATE:       2022 9:40 PM  DISCH DATE:          RESPONDING  PROVIDER #:        [id number]           QUERY TEXT:    It is unclear whether Sepsis was present on admission.  Your help is needed.  Please clarify the POA status.     NOTE:  If an appropriate response is not listed below, please respond with a new note.    Thank you,    [email or web address]        The patient's Clinical Indicators include:     2240: pulse 99, resp 25  2320: pulse 98, resp 25  WBC 17.2  Pt admitted .  Cardiac arrest      Decrease dobutamine back to 5-due to ectopy and tachycardia, and the fact it did not seem to improve status, especially with concern of sepsis    Sepsis, source ?aspiration pna in the setting of cardiac arrest    Treatment:  IV antibiotics, pressors    Risk:  MI, Pneumonia, Shock  Options provided:   -- The condition of Sepsis was present at the time of inpatient admission   -- The condition of Sepsis was not present at the time of inpatient admission   -- The provider is unable to clinically determine whether condition of Sepsis was present on admission or not      Query created by: Tuan Mitchell on 3/8/2022 1:37 PM    RESPONSE TEXT:    The condition of Sepsis was present at the time of inpatient admission          Electronically signed by:  BENEDICT CORONADO MD 3/10/2022 10:54 PM

## (undated) DEVICE — AMPLATZ SUPER STIFF 180CM - (5/BX)

## (undated) DEVICE — GLOVE BIOGEL PI INDICATOR SZ 7.0 SURGICAL PF LF - (50/BX 4BX/CA)

## (undated) DEVICE — MICRODRIP PRIMARY VENTED 60 (48EA/CA) THIS WAS PART #2C8428 WHICH WAS DISCONTINUED

## (undated) DEVICE — GLOVE BIOGEL INDICATOR SZ 7SURGICAL PF LTX - (50/BX 4BX/CA)

## (undated) DEVICE — D-5-W INJ. 500 ML - (24EA/CA)

## (undated) DEVICE — GLOVE BIOGEL SZ 8 SURGICAL PF LTX - (50PR/BX 4BX/CA)

## (undated) DEVICE — BAG SPONGE COUNT 10.25 X 32 - BLUE (250/CA)

## (undated) DEVICE — TRANSDUCER BIFURCATED MONITORING KIT (10EA/CA)

## (undated) DEVICE — SYS DLV COST CLS RM TEMP - INJECTATE (CO-SET II) (10EA/CA)

## (undated) DEVICE — GLOVE BIOGEL SZ 6.5 SURGICAL PF LTX (50PR/BX 4BX/CA)

## (undated) DEVICE — TOWEL STOP TIMEOUT SAFETY FLAG (40EA/CA)

## (undated) DEVICE — GOWN SURGEONS X-LARGE - DISP. (30/CA)

## (undated) DEVICE — TUBING CLEARLINK DUO-VENT - C-FLO (48EA/CA)

## (undated) DEVICE — SODIUM CHL IRRIGATION 0.9% 1000ML (12EA/CA)

## (undated) DEVICE — GOWN SURGICAL XX-LARGE - (28EA/CA) SIRUS NON REINFORCED

## (undated) DEVICE — SUTURE 1 SILK BLK BRD TIES 10-30 (12/BX)"

## (undated) DEVICE — ELECTRODE DFBR ADLT 6.5X5IN (12PR/CA) *8900-4003 PART # FOR CA QTY. PART #8900-4004 IS EACH QTY

## (undated) DEVICE — GLOVE BIOGEL INDICATOR SZ 8 SURGICAL PF LTX - (50/BX 4BX/CA)

## (undated) DEVICE — SET FLUID WARMING STANDARD FLOW - (10/CA)

## (undated) DEVICE — ELECTRODE DUAL RETURN W/ CORD - (50/PK)

## (undated) DEVICE — GLOVE BIOGEL SZ 7.5 SURGICAL PF LTX - (50PR/BX 4BX/CA)

## (undated) DEVICE — SET BIFURCATED BLOOD - (48EA/CS)

## (undated) DEVICE — KIT ANESTHESIA W/CIRCUIT & 3/LT BAG W/FILTER (20EA/CA)

## (undated) DEVICE — LACTATED RINGERS INJ 1000 ML - (14EA/CA 60CA/PF)

## (undated) DEVICE — CANISTER SUCTION 3000ML MECHANICAL FILTER AUTO SHUTOFF MEDI-VAC NONSTERILE LF DISP  (40EA/CA)

## (undated) DEVICE — PACK MAJOR BASIN - (2EA/CA)

## (undated) DEVICE — BLADE SURGICAL #11 - (50/BX)

## (undated) DEVICE — SUCTION INSTRUMENT YANKAUER BULBOUS TIP W/O VENT (50EA/CA)

## (undated) DEVICE — KIT TOURNIQUET DLP (40EA/PK)

## (undated) DEVICE — GLOVE BIOGEL SZ 8.5 SURGICAL PF LTX - (50PR/BX 4BX/CA)

## (undated) DEVICE — SET LEADWIRE 5 LEAD BEDSIDE DISPOSABLE ECG (1SET OF 5/EA)

## (undated) DEVICE — SOD. CHL. INJ. 0.9% 1000 ML - (14EA/CA 60CA/PF)

## (undated) DEVICE — SUTURE 0 SILK CT-1 (36PK/BX)

## (undated) DEVICE — STAPLER SKIN DISP - (6/BX 10BX/CA) VISISTAT

## (undated) DEVICE — SENSOR SPO2 NEO LNCS ADHESIVE (20/BX) SEE USER NOTES

## (undated) DEVICE — TOWELS CLOTH SURGICAL - (4/PK 20PK/CA)

## (undated) DEVICE — DRESSING TRANSPARENT FILM TEGADERM 2.375 X 2.75"  (100EA/BX)"

## (undated) DEVICE — DRAPE SURGICAL U 77X120 - (10/CA)

## (undated) DEVICE — SUTURE OHS

## (undated) DEVICE — NEPTUNE 4 PORT MANIFOLD - (20/PK)

## (undated) DEVICE — STOPCOCK MALE 4-WAY - (50/CA)

## (undated) DEVICE — PROTECTOR ULNA NERVE - (36PR/CA)

## (undated) DEVICE — SET EXTENSION WITH 2 PORTS (48EA/CA) ***PART #2C8610 IS A SUBSTITUTE*****

## (undated) DEVICE — ELECTRODE 850 FOAM ADHESIVE - HYDROGEL RADIOTRNSPRNT (50/PK)

## (undated) DEVICE — CHLORAPREP 26 ML APPLICATOR - ORANGE TINT(25/CA)

## (undated) DEVICE — ARMBOARD  SMALL IV 9 INLONG - (25EA/CA)

## (undated) DEVICE — HEAD HOLDER JUNIOR/ADULT

## (undated) DEVICE — SET INTRO MIRCROPUNCTURE - MPIS-501-SST

## (undated) DEVICE — CATHETER THERMALDILUTION SWAN - (5EA/CA)